# Patient Record
Sex: FEMALE | Race: WHITE | NOT HISPANIC OR LATINO
[De-identification: names, ages, dates, MRNs, and addresses within clinical notes are randomized per-mention and may not be internally consistent; named-entity substitution may affect disease eponyms.]

---

## 2017-01-04 ENCOUNTER — APPOINTMENT (OUTPATIENT)
Dept: SURGERY | Facility: CLINIC | Age: 62
End: 2017-01-04

## 2017-01-10 ENCOUNTER — APPOINTMENT (OUTPATIENT)
Dept: ORTHOPEDIC SURGERY | Facility: CLINIC | Age: 62
End: 2017-01-10

## 2017-01-10 VITALS — WEIGHT: 238 LBS | BODY MASS INDEX: 40.63 KG/M2 | HEIGHT: 64 IN

## 2017-01-10 DIAGNOSIS — M19.90 UNSPECIFIED OSTEOARTHRITIS, UNSPECIFIED SITE: ICD-10-CM

## 2017-01-15 PROBLEM — M19.90 ARTHRITIS OF BOTH KNEES: Status: ACTIVE | Noted: 2017-01-15

## 2017-01-15 PROBLEM — M19.90 ARTHRITIS OF KNEE, RIGHT: Status: ACTIVE | Noted: 2017-01-10

## 2017-01-31 ENCOUNTER — OUTPATIENT (OUTPATIENT)
Dept: OUTPATIENT SERVICES | Facility: HOSPITAL | Age: 62
LOS: 1 days | End: 2017-01-31
Payer: COMMERCIAL

## 2017-01-31 VITALS
HEIGHT: 64 IN | SYSTOLIC BLOOD PRESSURE: 130 MMHG | HEART RATE: 83 BPM | TEMPERATURE: 98 F | DIASTOLIC BLOOD PRESSURE: 80 MMHG | RESPIRATION RATE: 16 BRPM | WEIGHT: 238.1 LBS

## 2017-01-31 DIAGNOSIS — Z90.49 ACQUIRED ABSENCE OF OTHER SPECIFIED PARTS OF DIGESTIVE TRACT: Chronic | ICD-10-CM

## 2017-01-31 DIAGNOSIS — S01.91XA LACERATION WITHOUT FOREIGN BODY OF UNSPECIFIED PART OF HEAD, INITIAL ENCOUNTER: Chronic | ICD-10-CM

## 2017-01-31 DIAGNOSIS — Z98.890 OTHER SPECIFIED POSTPROCEDURAL STATES: Chronic | ICD-10-CM

## 2017-01-31 DIAGNOSIS — M19.90 UNSPECIFIED OSTEOARTHRITIS, UNSPECIFIED SITE: ICD-10-CM

## 2017-01-31 DIAGNOSIS — Z98.51 TUBAL LIGATION STATUS: Chronic | ICD-10-CM

## 2017-01-31 DIAGNOSIS — Z01.818 ENCOUNTER FOR OTHER PREPROCEDURAL EXAMINATION: ICD-10-CM

## 2017-01-31 LAB
ALBUMIN SERPL ELPH-MCNC: 3.7 G/DL — SIGNIFICANT CHANGE UP (ref 3.3–5)
ALP SERPL-CCNC: 78 U/L — SIGNIFICANT CHANGE UP (ref 30–120)
ALT FLD-CCNC: 24 U/L DA — SIGNIFICANT CHANGE UP (ref 10–60)
ANION GAP SERPL CALC-SCNC: 9 MMOL/L — SIGNIFICANT CHANGE UP (ref 5–17)
APTT BLD: 30.3 SEC — SIGNIFICANT CHANGE UP (ref 27.5–37.4)
AST SERPL-CCNC: 15 U/L — SIGNIFICANT CHANGE UP (ref 10–40)
BILIRUB SERPL-MCNC: 0.3 MG/DL — SIGNIFICANT CHANGE UP (ref 0.2–1.2)
BLD GP AB SCN SERPL QL: SIGNIFICANT CHANGE UP
BUN SERPL-MCNC: 17 MG/DL — SIGNIFICANT CHANGE UP (ref 7–23)
CALCIUM SERPL-MCNC: 9.1 MG/DL — SIGNIFICANT CHANGE UP (ref 8.4–10.5)
CHLORIDE SERPL-SCNC: 106 MMOL/L — SIGNIFICANT CHANGE UP (ref 96–108)
CO2 SERPL-SCNC: 28 MMOL/L — SIGNIFICANT CHANGE UP (ref 22–31)
CREAT SERPL-MCNC: 0.75 MG/DL — SIGNIFICANT CHANGE UP (ref 0.5–1.3)
GLUCOSE SERPL-MCNC: 105 MG/DL — HIGH (ref 70–99)
HCT VFR BLD CALC: 40.3 % — SIGNIFICANT CHANGE UP (ref 34.5–45)
HGB BLD-MCNC: 12.7 G/DL — SIGNIFICANT CHANGE UP (ref 11.5–15.5)
INR BLD: 0.97 RATIO — SIGNIFICANT CHANGE UP (ref 0.88–1.16)
MCHC RBC-ENTMCNC: 28.8 PG — SIGNIFICANT CHANGE UP (ref 27–34)
MCHC RBC-ENTMCNC: 31.6 GM/DL — LOW (ref 32–36)
MCV RBC AUTO: 91.3 FL — SIGNIFICANT CHANGE UP (ref 80–100)
MRSA PCR RESULT.: SIGNIFICANT CHANGE UP
PLATELET # BLD AUTO: 230 K/UL — SIGNIFICANT CHANGE UP (ref 150–400)
POTASSIUM SERPL-MCNC: 3.8 MMOL/L — SIGNIFICANT CHANGE UP (ref 3.5–5.3)
POTASSIUM SERPL-SCNC: 3.8 MMOL/L — SIGNIFICANT CHANGE UP (ref 3.5–5.3)
PROT SERPL-MCNC: 7.4 G/DL — SIGNIFICANT CHANGE UP (ref 6–8.3)
PROTHROM AB SERPL-ACNC: 10.9 SEC — SIGNIFICANT CHANGE UP (ref 10–13.1)
RBC # BLD: 4.42 M/UL — SIGNIFICANT CHANGE UP (ref 3.8–5.2)
RBC # FLD: 12.5 % — SIGNIFICANT CHANGE UP (ref 10.3–14.5)
S AUREUS DNA NOSE QL NAA+PROBE: DETECTED
SODIUM SERPL-SCNC: 143 MMOL/L — SIGNIFICANT CHANGE UP (ref 135–145)
WBC # BLD: 7.4 K/UL — SIGNIFICANT CHANGE UP (ref 3.8–10.5)
WBC # FLD AUTO: 7.4 K/UL — SIGNIFICANT CHANGE UP (ref 3.8–10.5)

## 2017-01-31 PROCEDURE — 93010 ELECTROCARDIOGRAM REPORT: CPT

## 2017-01-31 PROCEDURE — 80053 COMPREHEN METABOLIC PANEL: CPT

## 2017-01-31 PROCEDURE — 85027 COMPLETE CBC AUTOMATED: CPT

## 2017-01-31 PROCEDURE — 85730 THROMBOPLASTIN TIME PARTIAL: CPT

## 2017-01-31 PROCEDURE — 93005 ELECTROCARDIOGRAM TRACING: CPT

## 2017-01-31 PROCEDURE — 87640 STAPH A DNA AMP PROBE: CPT

## 2017-01-31 PROCEDURE — 36415 COLL VENOUS BLD VENIPUNCTURE: CPT

## 2017-01-31 PROCEDURE — 86900 BLOOD TYPING SEROLOGIC ABO: CPT

## 2017-01-31 PROCEDURE — 86901 BLOOD TYPING SEROLOGIC RH(D): CPT

## 2017-01-31 PROCEDURE — 87641 MR-STAPH DNA AMP PROBE: CPT

## 2017-01-31 PROCEDURE — G0463: CPT

## 2017-01-31 PROCEDURE — 86850 RBC ANTIBODY SCREEN: CPT

## 2017-01-31 PROCEDURE — 85610 PROTHROMBIN TIME: CPT

## 2017-01-31 RX ORDER — MELOXICAM 15 MG/1
1 TABLET ORAL
Qty: 0 | Refills: 0 | COMMUNITY

## 2017-01-31 RX ORDER — AMLODIPINE BESYLATE 2.5 MG/1
1 TABLET ORAL
Qty: 0 | Refills: 0 | COMMUNITY

## 2017-01-31 NOTE — H&P PST ADULT - NSANTHOSAYNRD_GEN_A_CORE
No. MIRIAN screening performed.  STOP BANG Legend: 0-2 = LOW Risk; 3-4 = INTERMEDIATE Risk; 5-8 = HIGH Risk

## 2017-01-31 NOTE — H&P PST ADULT - FAMILY HISTORY
<<-----Click on this checkbox to enter Family History Family history of lung cancer, MS mother     Father  Still living? No  Family history of liver cancer, Age at diagnosis: Age Unknown     Sibling  Still living? Yes, Estimated age: 51-60  Family history of lupus erythematosus, Age at diagnosis: Age Unknown     Child  Still living? Yes, Estimated age: 31-40  Family history of Crohn's disease, Age at diagnosis: Age Unknown
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
## 2017-01-31 NOTE — H&P PST ADULT - HISTORY OF PRESENT ILLNESS
This is a 60 y/o female who presents with progressively worsening bilateral kneepain worse pain in the right knee . Reports constant pain 3/10 at rest and worst pain 8/10 when getting up from sitting position , driving, walking . patient was taking meloxicam for pain but stopped on 1/16/17 for surgery . scheduled for right knee replacement This is a 62 y/o female who presents with progressively worsening bilateral knee pain worse pain in the right knee . Reports constant pain 3/10 at rest and worst pain 8/10 when getting up from sitting position , driving, and  walking . patient was taking Meloxicam for pain but stopped on 1/16/17 for surgery . scheduled for right knee replacement

## 2017-01-31 NOTE — H&P PST ADULT - PMH
HTN (hypertension)    Hypothyroid    IBS (irritable bowel syndrome)    Osteoarthritis    Positional vertigo    Prosthetic eye globe  left eye GERD (gastroesophageal reflux disease)    HTN (hypertension)    Hypothyroid    IBS (irritable bowel syndrome)    Osteoarthritis    Positional vertigo    Prosthetic eye globe  left eye

## 2017-02-01 RX ORDER — MUPIROCIN 20 MG/G
1 OINTMENT TOPICAL
Qty: 1 | Refills: 0 | OUTPATIENT
Start: 2017-02-01 | End: 2017-02-06

## 2017-02-14 RX ORDER — SODIUM CHLORIDE 9 MG/ML
1000 INJECTION, SOLUTION INTRAVENOUS
Qty: 0 | Refills: 0 | Status: DISCONTINUED | OUTPATIENT
Start: 2017-02-17 | End: 2017-02-17

## 2017-02-16 ENCOUNTER — RESULT REVIEW (OUTPATIENT)
Age: 62
End: 2017-02-16

## 2017-02-16 RX ORDER — ONDANSETRON 8 MG/1
4 TABLET, FILM COATED ORAL EVERY 6 HOURS
Qty: 0 | Refills: 0 | Status: DISCONTINUED | OUTPATIENT
Start: 2017-02-17 | End: 2017-02-21

## 2017-02-16 RX ORDER — MAGNESIUM HYDROXIDE 400 MG/1
30 TABLET, CHEWABLE ORAL DAILY
Qty: 0 | Refills: 0 | Status: DISCONTINUED | OUTPATIENT
Start: 2017-02-17 | End: 2017-02-21

## 2017-02-16 RX ORDER — POLYETHYLENE GLYCOL 3350 17 G/17G
17 POWDER, FOR SOLUTION ORAL DAILY
Qty: 0 | Refills: 0 | Status: DISCONTINUED | OUTPATIENT
Start: 2017-02-17 | End: 2017-02-21

## 2017-02-16 RX ORDER — APREPITANT 80 MG/1
40 CAPSULE ORAL ONCE
Qty: 0 | Refills: 0 | Status: COMPLETED | OUTPATIENT
Start: 2017-02-17 | End: 2017-02-17

## 2017-02-16 RX ORDER — SODIUM CHLORIDE 9 MG/ML
1000 INJECTION, SOLUTION INTRAVENOUS
Qty: 0 | Refills: 0 | Status: DISCONTINUED | OUTPATIENT
Start: 2017-02-17 | End: 2017-02-21

## 2017-02-16 RX ORDER — DOCUSATE SODIUM 100 MG
100 CAPSULE ORAL THREE TIMES A DAY
Qty: 0 | Refills: 0 | Status: DISCONTINUED | OUTPATIENT
Start: 2017-02-17 | End: 2017-02-21

## 2017-02-16 RX ORDER — OXYCODONE HYDROCHLORIDE 5 MG/1
10 TABLET ORAL ONCE
Qty: 0 | Refills: 0 | Status: DISCONTINUED | OUTPATIENT
Start: 2017-02-17 | End: 2017-02-17

## 2017-02-16 RX ORDER — SENNA PLUS 8.6 MG/1
2 TABLET ORAL AT BEDTIME
Qty: 0 | Refills: 0 | Status: DISCONTINUED | OUTPATIENT
Start: 2017-02-17 | End: 2017-02-21

## 2017-02-16 RX ORDER — CELECOXIB 200 MG/1
200 CAPSULE ORAL ONCE
Qty: 0 | Refills: 0 | Status: COMPLETED | OUTPATIENT
Start: 2017-02-17 | End: 2017-02-17
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
## 2017-02-16 NOTE — PATIENT PROFILE ADULT. - PMH
GERD (gastroesophageal reflux disease)    Hypothyroid    IBS (irritable bowel syndrome)    Osteoarthritis    Positional vertigo    Prosthetic eye globe  left eye

## 2017-02-17 ENCOUNTER — TRANSCRIPTION ENCOUNTER (OUTPATIENT)
Age: 62
End: 2017-02-17

## 2017-02-17 ENCOUNTER — APPOINTMENT (OUTPATIENT)
Dept: ORTHOPEDIC SURGERY | Facility: HOSPITAL | Age: 62
End: 2017-02-17

## 2017-02-17 ENCOUNTER — INPATIENT (INPATIENT)
Facility: HOSPITAL | Age: 62
LOS: 3 days | Discharge: HOME | DRG: 470 | End: 2017-02-21
Attending: ORTHOPAEDIC SURGERY | Admitting: ORTHOPAEDIC SURGERY
Payer: COMMERCIAL

## 2017-02-17 VITALS
HEART RATE: 85 BPM | DIASTOLIC BLOOD PRESSURE: 83 MMHG | SYSTOLIC BLOOD PRESSURE: 134 MMHG | RESPIRATION RATE: 13 BRPM | HEIGHT: 64 IN | TEMPERATURE: 98 F | WEIGHT: 238.76 LBS

## 2017-02-17 DIAGNOSIS — M19.90 UNSPECIFIED OSTEOARTHRITIS, UNSPECIFIED SITE: ICD-10-CM

## 2017-02-17 DIAGNOSIS — Z90.49 ACQUIRED ABSENCE OF OTHER SPECIFIED PARTS OF DIGESTIVE TRACT: Chronic | ICD-10-CM

## 2017-02-17 DIAGNOSIS — Z98.51 TUBAL LIGATION STATUS: Chronic | ICD-10-CM

## 2017-02-17 DIAGNOSIS — S01.91XA LACERATION WITHOUT FOREIGN BODY OF UNSPECIFIED PART OF HEAD, INITIAL ENCOUNTER: Chronic | ICD-10-CM

## 2017-02-17 DIAGNOSIS — Z98.890 OTHER SPECIFIED POSTPROCEDURAL STATES: Chronic | ICD-10-CM

## 2017-02-17 LAB
ABO RH CONFIRMATION: SIGNIFICANT CHANGE UP
ANION GAP SERPL CALC-SCNC: 11 MMOL/L — SIGNIFICANT CHANGE UP (ref 5–17)
BUN SERPL-MCNC: 9 MG/DL — SIGNIFICANT CHANGE UP (ref 7–23)
CALCIUM SERPL-MCNC: 8 MG/DL — LOW (ref 8.4–10.5)
CHLORIDE SERPL-SCNC: 107 MMOL/L — SIGNIFICANT CHANGE UP (ref 96–108)
CO2 SERPL-SCNC: 22 MMOL/L — SIGNIFICANT CHANGE UP (ref 22–31)
CREAT SERPL-MCNC: 0.74 MG/DL — SIGNIFICANT CHANGE UP (ref 0.5–1.3)
GLUCOSE SERPL-MCNC: 219 MG/DL — HIGH (ref 70–99)
HCT VFR BLD CALC: 34.5 % — SIGNIFICANT CHANGE UP (ref 34.5–45)
HGB BLD-MCNC: 11 G/DL — LOW (ref 11.5–15.5)
POTASSIUM SERPL-MCNC: 3.7 MMOL/L — SIGNIFICANT CHANGE UP (ref 3.5–5.3)
POTASSIUM SERPL-SCNC: 3.7 MMOL/L — SIGNIFICANT CHANGE UP (ref 3.5–5.3)
SODIUM SERPL-SCNC: 140 MMOL/L — SIGNIFICANT CHANGE UP (ref 135–145)

## 2017-02-17 PROCEDURE — 88305 TISSUE EXAM BY PATHOLOGIST: CPT | Mod: 26

## 2017-02-17 PROCEDURE — 88311 DECALCIFY TISSUE: CPT | Mod: 26

## 2017-02-17 PROCEDURE — 73562 X-RAY EXAM OF KNEE 3: CPT | Mod: 26,RT

## 2017-02-17 PROCEDURE — 27447 TOTAL KNEE ARTHROPLASTY: CPT | Mod: RT

## 2017-02-17 PROCEDURE — 99222 1ST HOSP IP/OBS MODERATE 55: CPT

## 2017-02-17 RX ORDER — ACETAMINOPHEN 500 MG
1000 TABLET ORAL EVERY 8 HOURS
Qty: 0 | Refills: 0 | Status: DISCONTINUED | OUTPATIENT
Start: 2017-02-18 | End: 2017-02-21

## 2017-02-17 RX ORDER — SODIUM CHLORIDE 9 MG/ML
1000 INJECTION, SOLUTION INTRAVENOUS
Qty: 0 | Refills: 0 | Status: DISCONTINUED | OUTPATIENT
Start: 2017-02-17 | End: 2017-02-17

## 2017-02-17 RX ORDER — OXYCODONE HYDROCHLORIDE 5 MG/1
5 TABLET ORAL
Qty: 0 | Refills: 0 | Status: DISCONTINUED | OUTPATIENT
Start: 2017-02-17 | End: 2017-02-18

## 2017-02-17 RX ORDER — AMITRIPTYLINE HCL 25 MG
50 TABLET ORAL AT BEDTIME
Qty: 0 | Refills: 0 | Status: DISCONTINUED | OUTPATIENT
Start: 2017-02-17 | End: 2017-02-21

## 2017-02-17 RX ORDER — ACETAMINOPHEN 500 MG
1000 TABLET ORAL EVERY 6 HOURS
Qty: 0 | Refills: 0 | Status: COMPLETED | OUTPATIENT
Start: 2017-02-17 | End: 2017-02-18

## 2017-02-17 RX ORDER — HYDROMORPHONE HYDROCHLORIDE 2 MG/ML
0.5 INJECTION INTRAMUSCULAR; INTRAVENOUS; SUBCUTANEOUS
Qty: 0 | Refills: 0 | Status: DISCONTINUED | OUTPATIENT
Start: 2017-02-17 | End: 2017-02-17

## 2017-02-17 RX ORDER — LIOTHYRONINE SODIUM 25 UG/1
25 TABLET ORAL DAILY
Qty: 0 | Refills: 0 | Status: DISCONTINUED | OUTPATIENT
Start: 2017-02-17 | End: 2017-02-21

## 2017-02-17 RX ORDER — HYDROMORPHONE HYDROCHLORIDE 2 MG/ML
0.5 INJECTION INTRAMUSCULAR; INTRAVENOUS; SUBCUTANEOUS
Qty: 0 | Refills: 0 | Status: DISCONTINUED | OUTPATIENT
Start: 2017-02-17 | End: 2017-02-21

## 2017-02-17 RX ORDER — ACETAMINOPHEN 500 MG
1000 TABLET ORAL ONCE
Qty: 0 | Refills: 0 | Status: COMPLETED | OUTPATIENT
Start: 2017-02-17 | End: 2017-02-17

## 2017-02-17 RX ORDER — CEFAZOLIN SODIUM 1 G
2000 VIAL (EA) INJECTION ONCE
Qty: 0 | Refills: 0 | Status: COMPLETED | OUTPATIENT
Start: 2017-02-17 | End: 2017-02-17

## 2017-02-17 RX ORDER — APIXABAN 2.5 MG/1
2.5 TABLET, FILM COATED ORAL
Qty: 0 | Refills: 0 | Status: COMPLETED | OUTPATIENT
Start: 2017-02-18 | End: 2017-02-18

## 2017-02-17 RX ORDER — MECLIZINE HCL 12.5 MG
1 TABLET ORAL
Qty: 0 | Refills: 0 | COMMUNITY

## 2017-02-17 RX ORDER — LEVOTHYROXINE SODIUM 125 MCG
100 TABLET ORAL DAILY
Qty: 0 | Refills: 0 | Status: DISCONTINUED | OUTPATIENT
Start: 2017-02-17 | End: 2017-02-21

## 2017-02-17 RX ORDER — TRANEXAMIC ACID 100 MG/ML
1000 INJECTION, SOLUTION INTRAVENOUS ONCE
Qty: 0 | Refills: 0 | Status: COMPLETED | OUTPATIENT
Start: 2017-02-17 | End: 2017-02-17

## 2017-02-17 RX ORDER — APIXABAN 2.5 MG/1
2.5 TABLET, FILM COATED ORAL
Qty: 0 | Refills: 0 | Status: COMPLETED | OUTPATIENT
Start: 2017-02-19 | End: 2017-02-19

## 2017-02-17 RX ORDER — MECLIZINE HCL 12.5 MG
25 TABLET ORAL DAILY
Qty: 0 | Refills: 0 | Status: DISCONTINUED | OUTPATIENT
Start: 2017-02-17 | End: 2017-02-21

## 2017-02-17 RX ORDER — OXYCODONE HYDROCHLORIDE 5 MG/1
10 TABLET ORAL
Qty: 0 | Refills: 0 | Status: DISCONTINUED | OUTPATIENT
Start: 2017-02-17 | End: 2017-02-18

## 2017-02-17 RX ORDER — CEFAZOLIN SODIUM 1 G
2000 VIAL (EA) INJECTION EVERY 8 HOURS
Qty: 0 | Refills: 0 | Status: COMPLETED | OUTPATIENT
Start: 2017-02-17 | End: 2017-02-18

## 2017-02-17 RX ORDER — PANTOPRAZOLE SODIUM 20 MG/1
40 TABLET, DELAYED RELEASE ORAL
Qty: 0 | Refills: 0 | Status: DISCONTINUED | OUTPATIENT
Start: 2017-02-17 | End: 2017-02-21

## 2017-02-17 RX ORDER — APIXABAN 2.5 MG/1
2.5 TABLET, FILM COATED ORAL EVERY 12 HOURS
Qty: 0 | Refills: 0 | Status: DISCONTINUED | OUTPATIENT
Start: 2017-02-19 | End: 2017-02-21

## 2017-02-17 RX ADMIN — ONDANSETRON 4 MILLIGRAM(S): 8 TABLET, FILM COATED ORAL at 19:56

## 2017-02-17 RX ADMIN — APREPITANT 40 MILLIGRAM(S): 80 CAPSULE ORAL at 13:11

## 2017-02-17 RX ADMIN — Medication 200 MILLIGRAM(S): at 18:47

## 2017-02-17 RX ADMIN — SODIUM CHLORIDE 75 MILLILITER(S): 9 INJECTION, SOLUTION INTRAVENOUS at 13:12

## 2017-02-17 RX ADMIN — Medication 100 MILLIGRAM(S): at 23:02

## 2017-02-17 RX ADMIN — CELECOXIB 200 MILLIGRAM(S): 200 CAPSULE ORAL at 13:11

## 2017-02-17 RX ADMIN — Medication 400 MILLIGRAM(S): at 23:01

## 2017-02-17 RX ADMIN — Medication 25 MILLIGRAM(S): at 20:04

## 2017-02-17 RX ADMIN — Medication 1000 MILLIGRAM(S): at 23:01

## 2017-02-17 RX ADMIN — OXYCODONE HYDROCHLORIDE 10 MILLIGRAM(S): 5 TABLET ORAL at 13:11

## 2017-02-18 LAB
ANION GAP SERPL CALC-SCNC: 10 MMOL/L — SIGNIFICANT CHANGE UP (ref 5–17)
BUN SERPL-MCNC: 7 MG/DL — SIGNIFICANT CHANGE UP (ref 7–23)
CALCIUM SERPL-MCNC: 8.5 MG/DL — SIGNIFICANT CHANGE UP (ref 8.4–10.5)
CHLORIDE SERPL-SCNC: 109 MMOL/L — HIGH (ref 96–108)
CO2 SERPL-SCNC: 23 MMOL/L — SIGNIFICANT CHANGE UP (ref 22–31)
CREAT SERPL-MCNC: 0.69 MG/DL — SIGNIFICANT CHANGE UP (ref 0.5–1.3)
GLUCOSE SERPL-MCNC: 110 MG/DL — HIGH (ref 70–99)
HCT VFR BLD CALC: 37.9 % — SIGNIFICANT CHANGE UP (ref 34.5–45)
HGB BLD-MCNC: 12.8 G/DL — SIGNIFICANT CHANGE UP (ref 11.5–15.5)
MCHC RBC-ENTMCNC: 30.2 PG — SIGNIFICANT CHANGE UP (ref 27–34)
MCHC RBC-ENTMCNC: 33.7 GM/DL — SIGNIFICANT CHANGE UP (ref 32–36)
MCV RBC AUTO: 89.4 FL — SIGNIFICANT CHANGE UP (ref 80–100)
PLATELET # BLD AUTO: 147 K/UL — LOW (ref 150–400)
POTASSIUM SERPL-MCNC: 3.8 MMOL/L — SIGNIFICANT CHANGE UP (ref 3.5–5.3)
POTASSIUM SERPL-SCNC: 3.8 MMOL/L — SIGNIFICANT CHANGE UP (ref 3.5–5.3)
RBC # BLD: 4.23 M/UL — SIGNIFICANT CHANGE UP (ref 3.8–5.2)
RBC # FLD: 12.4 % — SIGNIFICANT CHANGE UP (ref 10.3–14.5)
SODIUM SERPL-SCNC: 142 MMOL/L — SIGNIFICANT CHANGE UP (ref 135–145)
WBC # BLD: 6.7 K/UL — SIGNIFICANT CHANGE UP (ref 3.8–10.5)
WBC # FLD AUTO: 6.7 K/UL — SIGNIFICANT CHANGE UP (ref 3.8–10.5)

## 2017-02-18 PROCEDURE — 99233 SBSQ HOSP IP/OBS HIGH 50: CPT

## 2017-02-18 RX ORDER — TRAMADOL HYDROCHLORIDE 50 MG/1
50 TABLET ORAL EVERY 6 HOURS
Qty: 0 | Refills: 0 | Status: DISCONTINUED | OUTPATIENT
Start: 2017-02-18 | End: 2017-02-21

## 2017-02-18 RX ORDER — TRAMADOL HYDROCHLORIDE 50 MG/1
100 TABLET ORAL EVERY 6 HOURS
Qty: 0 | Refills: 0 | Status: DISCONTINUED | OUTPATIENT
Start: 2017-02-18 | End: 2017-02-21

## 2017-02-18 RX ORDER — HYOSCYAMINE SULFATE 0.13 MG
0.38 TABLET ORAL
Qty: 0 | Refills: 0 | Status: DISCONTINUED | OUTPATIENT
Start: 2017-02-18 | End: 2017-02-21

## 2017-02-18 RX ADMIN — Medication 100 MICROGRAM(S): at 05:35

## 2017-02-18 RX ADMIN — Medication 25 MILLIGRAM(S): at 10:40

## 2017-02-18 RX ADMIN — Medication 100 MILLIGRAM(S): at 06:32

## 2017-02-18 RX ADMIN — Medication 1000 MILLIGRAM(S): at 17:38

## 2017-02-18 RX ADMIN — TRAMADOL HYDROCHLORIDE 100 MILLIGRAM(S): 50 TABLET ORAL at 19:31

## 2017-02-18 RX ADMIN — Medication 1000 MILLIGRAM(S): at 17:37

## 2017-02-18 RX ADMIN — APIXABAN 2.5 MILLIGRAM(S): 2.5 TABLET, FILM COATED ORAL at 15:22

## 2017-02-18 RX ADMIN — TRAMADOL HYDROCHLORIDE 100 MILLIGRAM(S): 50 TABLET ORAL at 20:01

## 2017-02-18 RX ADMIN — Medication 400 MILLIGRAM(S): at 10:34

## 2017-02-18 RX ADMIN — TRAMADOL HYDROCHLORIDE 50 MILLIGRAM(S): 50 TABLET ORAL at 12:40

## 2017-02-18 RX ADMIN — LIOTHYRONINE SODIUM 25 MICROGRAM(S): 25 TABLET ORAL at 10:34

## 2017-02-18 RX ADMIN — Medication 50 MILLIGRAM(S): at 21:47

## 2017-02-18 RX ADMIN — PANTOPRAZOLE SODIUM 40 MILLIGRAM(S): 20 TABLET, DELAYED RELEASE ORAL at 05:35

## 2017-02-18 RX ADMIN — Medication 1000 MILLIGRAM(S): at 10:38

## 2017-02-18 RX ADMIN — Medication 400 MILLIGRAM(S): at 05:35

## 2017-02-18 RX ADMIN — Medication 0.38 MILLIGRAM(S): at 11:45

## 2017-02-18 RX ADMIN — SODIUM CHLORIDE 75 MILLILITER(S): 9 INJECTION, SOLUTION INTRAVENOUS at 06:32

## 2017-02-18 RX ADMIN — TRAMADOL HYDROCHLORIDE 50 MILLIGRAM(S): 50 TABLET ORAL at 11:47

## 2017-02-18 RX ADMIN — Medication 1000 MILLIGRAM(S): at 05:43

## 2017-02-18 RX ADMIN — Medication 100 MILLIGRAM(S): at 05:35

## 2017-02-18 NOTE — PHYSICAL THERAPY INITIAL EVALUATION ADULT - ADDITIONAL COMMENTS
Pt lives with  in house with no steps to enter, 1 flight of stairs with 1 rail to bedroom.  Pt has SC and RW at home.  Prior to current, pt was I in amb and drove.

## 2017-02-18 NOTE — DIETITIAN INITIAL EVALUATION ADULT. - OTHER INFO
pt admitted with OA complications PMHx includes Gerds. IBS,OA. s/p cholestystectomy Pt has no complaints/conerns regarding diet at this point pt admitted with OA complications PMH includes Gerds. IBS, OA. s/p cholecystectomy Pt has no complaints/concerns regarding diet at this point

## 2017-02-18 NOTE — DIETITIAN INITIAL EVALUATION ADULT. - NUTRITIONGOAL OUTCOME1
Pt will improve nutrition practices,  Pt not expected to be compliant at this time due to immobility

## 2017-02-18 NOTE — PHYSICAL THERAPY INITIAL EVALUATION ADULT - ACTIVE RANGE OF MOTION EXAMINATION, REHAB EVAL
deficits as listed below/right LE/Left LE Active ROM was WFL (within functional limits)/adrienne. upper extremity Active ROM was WNL (within normal limits)

## 2017-02-18 NOTE — OCCUPATIONAL THERAPY INITIAL EVALUATION ADULT - ADDITIONAL COMMENTS
Live with her . no steps to enter. 12 inside with handrail. Has a stall shower. Own a RW and cane,   Commode ordered with case management. SC will not garfield in stall.

## 2017-02-19 ENCOUNTER — TRANSCRIPTION ENCOUNTER (OUTPATIENT)
Age: 62
End: 2017-02-19

## 2017-02-19 LAB
ANION GAP SERPL CALC-SCNC: 9 MMOL/L — SIGNIFICANT CHANGE UP (ref 5–17)
BUN SERPL-MCNC: 7 MG/DL — SIGNIFICANT CHANGE UP (ref 7–23)
CALCIUM SERPL-MCNC: 8.9 MG/DL — SIGNIFICANT CHANGE UP (ref 8.4–10.5)
CHLORIDE SERPL-SCNC: 109 MMOL/L — HIGH (ref 96–108)
CO2 SERPL-SCNC: 27 MMOL/L — SIGNIFICANT CHANGE UP (ref 22–31)
CREAT SERPL-MCNC: 0.73 MG/DL — SIGNIFICANT CHANGE UP (ref 0.5–1.3)
GLUCOSE SERPL-MCNC: 118 MG/DL — HIGH (ref 70–99)
HCT VFR BLD CALC: 34.4 % — LOW (ref 34.5–45)
HGB BLD-MCNC: 11.4 G/DL — LOW (ref 11.5–15.5)
MCHC RBC-ENTMCNC: 29.3 PG — SIGNIFICANT CHANGE UP (ref 27–34)
MCHC RBC-ENTMCNC: 33 GM/DL — SIGNIFICANT CHANGE UP (ref 32–36)
MCV RBC AUTO: 88.6 FL — SIGNIFICANT CHANGE UP (ref 80–100)
PLATELET # BLD AUTO: 187 K/UL — SIGNIFICANT CHANGE UP (ref 150–400)
POTASSIUM SERPL-MCNC: 3.5 MMOL/L — SIGNIFICANT CHANGE UP (ref 3.5–5.3)
POTASSIUM SERPL-SCNC: 3.5 MMOL/L — SIGNIFICANT CHANGE UP (ref 3.5–5.3)
RBC # BLD: 3.88 M/UL — SIGNIFICANT CHANGE UP (ref 3.8–5.2)
RBC # FLD: 12.1 % — SIGNIFICANT CHANGE UP (ref 10.3–14.5)
SODIUM SERPL-SCNC: 145 MMOL/L — SIGNIFICANT CHANGE UP (ref 135–145)
WBC # BLD: 9.7 K/UL — SIGNIFICANT CHANGE UP (ref 3.8–10.5)
WBC # FLD AUTO: 9.7 K/UL — SIGNIFICANT CHANGE UP (ref 3.8–10.5)

## 2017-02-19 PROCEDURE — 93970 EXTREMITY STUDY: CPT | Mod: 26

## 2017-02-19 PROCEDURE — 99232 SBSQ HOSP IP/OBS MODERATE 35: CPT

## 2017-02-19 RX ORDER — MECLIZINE HCL 12.5 MG
25 TABLET ORAL ONCE
Qty: 0 | Refills: 0 | Status: COMPLETED | OUTPATIENT
Start: 2017-02-19 | End: 2017-02-19

## 2017-02-19 RX ADMIN — Medication 1000 MILLIGRAM(S): at 08:51

## 2017-02-19 RX ADMIN — TRAMADOL HYDROCHLORIDE 100 MILLIGRAM(S): 50 TABLET ORAL at 21:56

## 2017-02-19 RX ADMIN — Medication 1000 MILLIGRAM(S): at 09:41

## 2017-02-19 RX ADMIN — Medication 1000 MILLIGRAM(S): at 18:18

## 2017-02-19 RX ADMIN — LIOTHYRONINE SODIUM 25 MICROGRAM(S): 25 TABLET ORAL at 05:49

## 2017-02-19 RX ADMIN — TRAMADOL HYDROCHLORIDE 100 MILLIGRAM(S): 50 TABLET ORAL at 15:14

## 2017-02-19 RX ADMIN — APIXABAN 2.5 MILLIGRAM(S): 2.5 TABLET, FILM COATED ORAL at 21:26

## 2017-02-19 RX ADMIN — APIXABAN 2.5 MILLIGRAM(S): 2.5 TABLET, FILM COATED ORAL at 00:11

## 2017-02-19 RX ADMIN — Medication 25 MILLIGRAM(S): at 09:08

## 2017-02-19 RX ADMIN — APIXABAN 2.5 MILLIGRAM(S): 2.5 TABLET, FILM COATED ORAL at 11:57

## 2017-02-19 RX ADMIN — TRAMADOL HYDROCHLORIDE 100 MILLIGRAM(S): 50 TABLET ORAL at 21:26

## 2017-02-19 RX ADMIN — Medication 25 MILLIGRAM(S): at 18:17

## 2017-02-19 RX ADMIN — TRAMADOL HYDROCHLORIDE 100 MILLIGRAM(S): 50 TABLET ORAL at 03:54

## 2017-02-19 RX ADMIN — PANTOPRAZOLE SODIUM 40 MILLIGRAM(S): 20 TABLET, DELAYED RELEASE ORAL at 05:49

## 2017-02-19 RX ADMIN — TRAMADOL HYDROCHLORIDE 100 MILLIGRAM(S): 50 TABLET ORAL at 03:24

## 2017-02-19 RX ADMIN — Medication 1000 MILLIGRAM(S): at 00:11

## 2017-02-19 RX ADMIN — Medication 50 MILLIGRAM(S): at 21:26

## 2017-02-19 RX ADMIN — TRAMADOL HYDROCHLORIDE 100 MILLIGRAM(S): 50 TABLET ORAL at 08:57

## 2017-02-19 RX ADMIN — Medication 1000 MILLIGRAM(S): at 01:21

## 2017-02-19 RX ADMIN — Medication 100 MICROGRAM(S): at 05:49

## 2017-02-19 RX ADMIN — Medication 100 MILLIGRAM(S): at 15:14

## 2017-02-20 ENCOUNTER — TRANSCRIPTION ENCOUNTER (OUTPATIENT)
Age: 62
End: 2017-02-20

## 2017-02-20 LAB
ANION GAP SERPL CALC-SCNC: 8 MMOL/L — SIGNIFICANT CHANGE UP (ref 5–17)
BUN SERPL-MCNC: 7 MG/DL — SIGNIFICANT CHANGE UP (ref 7–23)
CALCIUM SERPL-MCNC: 8.5 MG/DL — SIGNIFICANT CHANGE UP (ref 8.4–10.5)
CHLORIDE SERPL-SCNC: 107 MMOL/L — SIGNIFICANT CHANGE UP (ref 96–108)
CO2 SERPL-SCNC: 27 MMOL/L — SIGNIFICANT CHANGE UP (ref 22–31)
CREAT SERPL-MCNC: 0.57 MG/DL — SIGNIFICANT CHANGE UP (ref 0.5–1.3)
GLUCOSE SERPL-MCNC: 113 MG/DL — HIGH (ref 70–99)
HCT VFR BLD CALC: 33.4 % — LOW (ref 34.5–45)
HGB BLD-MCNC: 10.9 G/DL — LOW (ref 11.5–15.5)
MCHC RBC-ENTMCNC: 29 PG — SIGNIFICANT CHANGE UP (ref 27–34)
MCHC RBC-ENTMCNC: 32.5 GM/DL — SIGNIFICANT CHANGE UP (ref 32–36)
MCV RBC AUTO: 89.3 FL — SIGNIFICANT CHANGE UP (ref 80–100)
PLATELET # BLD AUTO: 168 K/UL — SIGNIFICANT CHANGE UP (ref 150–400)
POTASSIUM SERPL-MCNC: 3.3 MMOL/L — LOW (ref 3.5–5.3)
POTASSIUM SERPL-SCNC: 3.3 MMOL/L — LOW (ref 3.5–5.3)
RBC # BLD: 3.74 M/UL — LOW (ref 3.8–5.2)
RBC # FLD: 12.4 % — SIGNIFICANT CHANGE UP (ref 10.3–14.5)
SODIUM SERPL-SCNC: 142 MMOL/L — SIGNIFICANT CHANGE UP (ref 135–145)
WBC # BLD: 8.7 K/UL — SIGNIFICANT CHANGE UP (ref 3.8–10.5)
WBC # FLD AUTO: 8.7 K/UL — SIGNIFICANT CHANGE UP (ref 3.8–10.5)

## 2017-02-20 PROCEDURE — 99232 SBSQ HOSP IP/OBS MODERATE 35: CPT

## 2017-02-20 RX ORDER — POTASSIUM CHLORIDE 20 MEQ
40 PACKET (EA) ORAL ONCE
Qty: 0 | Refills: 0 | Status: COMPLETED | OUTPATIENT
Start: 2017-02-20 | End: 2017-02-20

## 2017-02-20 RX ADMIN — TRAMADOL HYDROCHLORIDE 100 MILLIGRAM(S): 50 TABLET ORAL at 14:41

## 2017-02-20 RX ADMIN — Medication 1000 MILLIGRAM(S): at 08:10

## 2017-02-20 RX ADMIN — TRAMADOL HYDROCHLORIDE 100 MILLIGRAM(S): 50 TABLET ORAL at 05:50

## 2017-02-20 RX ADMIN — Medication 40 MILLIEQUIVALENT(S): at 09:10

## 2017-02-20 RX ADMIN — SENNA PLUS 2 TABLET(S): 8.6 TABLET ORAL at 21:25

## 2017-02-20 RX ADMIN — TRAMADOL HYDROCHLORIDE 100 MILLIGRAM(S): 50 TABLET ORAL at 22:00

## 2017-02-20 RX ADMIN — LIOTHYRONINE SODIUM 25 MICROGRAM(S): 25 TABLET ORAL at 05:19

## 2017-02-20 RX ADMIN — Medication 25 MILLIGRAM(S): at 08:10

## 2017-02-20 RX ADMIN — APIXABAN 2.5 MILLIGRAM(S): 2.5 TABLET, FILM COATED ORAL at 21:25

## 2017-02-20 RX ADMIN — TRAMADOL HYDROCHLORIDE 100 MILLIGRAM(S): 50 TABLET ORAL at 21:25

## 2017-02-20 RX ADMIN — Medication 100 MILLIGRAM(S): at 21:25

## 2017-02-20 RX ADMIN — TRAMADOL HYDROCHLORIDE 100 MILLIGRAM(S): 50 TABLET ORAL at 05:20

## 2017-02-20 RX ADMIN — Medication 100 MILLIGRAM(S): at 13:34

## 2017-02-20 RX ADMIN — Medication 100 MICROGRAM(S): at 05:19

## 2017-02-20 RX ADMIN — PANTOPRAZOLE SODIUM 40 MILLIGRAM(S): 20 TABLET, DELAYED RELEASE ORAL at 05:19

## 2017-02-20 RX ADMIN — Medication 1000 MILLIGRAM(S): at 17:22

## 2017-02-20 RX ADMIN — TRAMADOL HYDROCHLORIDE 100 MILLIGRAM(S): 50 TABLET ORAL at 13:52

## 2017-02-20 RX ADMIN — APIXABAN 2.5 MILLIGRAM(S): 2.5 TABLET, FILM COATED ORAL at 08:10

## 2017-02-20 RX ADMIN — Medication 50 MILLIGRAM(S): at 21:25

## 2017-02-20 NOTE — DISCHARGE NOTE ADULT - CARE PROVIDERS DIRECT ADDRESSES
[email or web address] In :60 [email or web address]

## 2017-02-20 NOTE — DISCHARGE NOTE ADULT - MEDICATION SUMMARY - MEDICATIONS TO TAKE
I will START or STAY ON the medications listed below when I get home from the hospital:    acetaminophen 500 mg oral tablet  -- 2 tab(s) by mouth every 8 hours  -- Indication: For pain    traMADol 50 mg oral tablet  -- 1 tab(s) by mouth every 6 hours, As Needed -for moderate pain MDD:8  -- Indication: For pain    apixaban 2.5 mg oral tablet  -- 1 tab(s) by mouth every 12 hours until 3/1/17.  -- Indication: For prevention of blood clots    amitriptyline 50 mg oral tablet  -- 1 tab(s) by mouth once a day (at bedtime)  -- Indication: For depression    meclizine 25 mg oral tablet  -- 1 tab(s) by mouth once a day  -- Indication: For vertigo    docusate sodium 100 mg oral capsule  -- 1 cap(s) by mouth 3 times a day, As Needed - for constipation  -- Indication: For constipation    senna oral tablet  -- 2 tab(s) by mouth once a day (at bedtime), As Needed - for constipation  -- Indication: For constipation    omeprazole 40 mg oral delayed release capsule  -- 1 cap(s) by mouth once a day  -- Indication: For stomach protection    levothyroxine 100 mcg (0.1 mg) oral tablet  -- 1 tab(s) by mouth once a day  -- Indication: For hypothroidism    liothyronine 25 mcg oral tablet  -- 1 tab(s) by mouth once a day  -- Indication: For hypothyroidism

## 2017-02-20 NOTE — DISCHARGE NOTE ADULT - CARE PLAN
Principal Discharge DX:	S/P TKR (total knee replacement) using cement, right  Goal:	Improve quality of life  Instructions for follow-up, activity and diet:	Physical Therapy/Occupational Therapy for: ambulation, transfers, stairs, ADL's (activities of daily living), range of motion exercises, and isometrics  -Activity  • Weight Bearing as tolerated with rolling walker.  • Take short, frequent walks increasing the distance that you walk each day as tolerated.  • Change your position every hour to decrease pain and stiffness.  • Continue the exercises taught to you by your physical therapist.  • No driving until cleared by the doctor.  • No tub baths, hot tubs, or swimming pools until instructed by your doctor.  • Do not squat down on the floor.  • Do not kneel or twist your knee.  • Range of Motion Goals: Flexion= 120 degrees, Extension = 0 degrees  Keep incision clean and dry. May shower 5 days after surgery if no drainage from incision.  Suture/Prineo removal 2 weeks after surgery at Surgeon's office.  Instructions for follow-up, activity and diet:	Call your doctor if you experience:  • An increase in pain not controlled by pain medication or change in activity or  position.  • Temperature greater than 101° F.  • Redness, increased swelling or foul smelling drainage from or around the  incision.  • Numbness, tingling or a change in color or temperature of the operative leg.  • Call your doctor immediately if you experience chest pain, shortness of breath or calf pain.    For Constipation :   • Increase your water intake. Drink at least 8 glasses of water daily.  • Try adding fiber to your diet by eating fruits, vegetables and foods that are rich in grains.  • If you do experience constipation, you may take an over-the-counter stool softener/laxative such as Colace, Senokot or Milk of Magnesia.

## 2017-02-20 NOTE — DISCHARGE NOTE ADULT - HOME CARE AGENCY
[facility] [phone] [facility] [phone]; [facility] nurse/Home Care RN will call the day after discharge to set-up visit appointment for that day then PT/OT services will also follow; if you have NOT heard from anyone by 12 noon the day after discharge, please call home care agency.

## 2017-02-20 NOTE — DISCHARGE NOTE ADULT - VISION (WITH CORRECTIVE LENSES IF THE PATIENT USUALLY WEARS THEM):
Pt has left eye prosthesis/Partially impaired: cannot see medication labels or newsprint, but can see obstacles in path, and the surrounding layout; can count fingers at arm's length

## 2017-02-20 NOTE — DISCHARGE NOTE ADULT - DURABLE MEDICAL EQUIPMENT AGENCY
3in 1 commode thru [facility] [phone] 3in 1 commode thru [facility] [phone]; pt has SAC and RW @ home PTA.

## 2017-02-20 NOTE — DISCHARGE NOTE ADULT - NS AS ACTIVITY OBS
Walking-Indoors allowed/Do not drive or operate machinery/No Heavy lifting/straining/Walking-Outdoors allowed/Stairs allowed/Showering allowed

## 2017-02-20 NOTE — DISCHARGE NOTE ADULT - MEDICATION SUMMARY - MEDICATIONS TO STOP TAKING
I will STOP taking the medications listed below when I get home from the hospital:    mupirocin topical 2% topical ointment  -- Apply to each nostril 2 times a day  -- For external use only.

## 2017-02-20 NOTE — DISCHARGE NOTE ADULT - PLAN OF CARE
Improve quality of life Physical Therapy/Occupational Therapy for: ambulation, transfers, stairs, ADL's (activities of daily living), range of motion exercises, and isometrics  -Activity  • Weight Bearing as tolerated with rolling walker.  • Take short, frequent walks increasing the distance that you walk each day as tolerated.  • Change your position every hour to decrease pain and stiffness.  • Continue the exercises taught to you by your physical therapist.  • No driving until cleared by the doctor.  • No tub baths, hot tubs, or swimming pools until instructed by your doctor.  • Do not squat down on the floor.  • Do not kneel or twist your knee.  • Range of Motion Goals: Flexion= 120 degrees, Extension = 0 degrees  Keep incision clean and dry. May shower 5 days after surgery if no drainage from incision.  Suture/Prineo removal 2 weeks after surgery at Surgeon's office. Call your doctor if you experience:  • An increase in pain not controlled by pain medication or change in activity or  position.  • Temperature greater than 101° F.  • Redness, increased swelling or foul smelling drainage from or around the  incision.  • Numbness, tingling or a change in color or temperature of the operative leg.  • Call your doctor immediately if you experience chest pain, shortness of breath or calf pain.    For Constipation :   • Increase your water intake. Drink at least 8 glasses of water daily.  • Try adding fiber to your diet by eating fruits, vegetables and foods that are rich in grains.  • If you do experience constipation, you may take an over-the-counter stool softener/laxative such as Colace, Senokot or Milk of Magnesia.

## 2017-02-20 NOTE — DISCHARGE NOTE ADULT - PATIENT PORTAL LINK FT
“You can access the Follow[facility] Patient Portal, offered by [facility], by registering with the following website: [email or web address]”

## 2017-02-20 NOTE — DISCHARGE NOTE ADULT - HOSPITAL COURSE
This patient was admitted to [facility] with a history of severe degenerative joint disease of the right knee.  Patient went to Pre-Surgical Testing at [facility] and was medically cleared to undergo elective procedure.  No operative or tushar-operative complications arose during patients hospital course.  Patient received antibiotic according to SCIP guidelines for infection prevention.  Eliquis was given for DVT prophylaxis.  Anesthesia, Medical Hospitalist, Physical Therapy and Occupational Therapy were consulted. Patient is stable for discharge with a good prognosis.  Appropriate discharge instructions and medications are provided in this document.

## 2017-02-21 VITALS
RESPIRATION RATE: 16 BRPM | TEMPERATURE: 98 F | OXYGEN SATURATION: 92 % | HEART RATE: 71 BPM | DIASTOLIC BLOOD PRESSURE: 73 MMHG | SYSTOLIC BLOOD PRESSURE: 152 MMHG

## 2017-02-21 LAB
POTASSIUM SERPL-MCNC: 3.4 MMOL/L — LOW (ref 3.5–5.3)
POTASSIUM SERPL-SCNC: 3.4 MMOL/L — LOW (ref 3.5–5.3)

## 2017-02-21 PROCEDURE — 80048 BASIC METABOLIC PNL TOTAL CA: CPT

## 2017-02-21 PROCEDURE — 97530 THERAPEUTIC ACTIVITIES: CPT

## 2017-02-21 PROCEDURE — C1776: CPT

## 2017-02-21 PROCEDURE — 97161 PT EVAL LOW COMPLEX 20 MIN: CPT

## 2017-02-21 PROCEDURE — C1713: CPT

## 2017-02-21 PROCEDURE — 73562 X-RAY EXAM OF KNEE 3: CPT

## 2017-02-21 PROCEDURE — 99233 SBSQ HOSP IP/OBS HIGH 50: CPT

## 2017-02-21 PROCEDURE — 97165 OT EVAL LOW COMPLEX 30 MIN: CPT

## 2017-02-21 PROCEDURE — 94664 DEMO&/EVAL PT USE INHALER: CPT

## 2017-02-21 PROCEDURE — 84132 ASSAY OF SERUM POTASSIUM: CPT

## 2017-02-21 PROCEDURE — 85027 COMPLETE CBC AUTOMATED: CPT

## 2017-02-21 PROCEDURE — 88311 DECALCIFY TISSUE: CPT

## 2017-02-21 PROCEDURE — 97535 SELF CARE MNGMENT TRAINING: CPT

## 2017-02-21 PROCEDURE — 88305 TISSUE EXAM BY PATHOLOGIST: CPT

## 2017-02-21 PROCEDURE — 97116 GAIT TRAINING THERAPY: CPT

## 2017-02-21 PROCEDURE — 36415 COLL VENOUS BLD VENIPUNCTURE: CPT

## 2017-02-21 PROCEDURE — 97110 THERAPEUTIC EXERCISES: CPT

## 2017-02-21 PROCEDURE — 85018 HEMOGLOBIN: CPT

## 2017-02-21 PROCEDURE — 93970 EXTREMITY STUDY: CPT

## 2017-02-21 RX ORDER — MECLIZINE HCL 12.5 MG
1 TABLET ORAL
Qty: 0 | Refills: 0 | COMMUNITY

## 2017-02-21 RX ORDER — AMITRIPTYLINE HCL 25 MG
1 TABLET ORAL
Qty: 0 | Refills: 0 | COMMUNITY
Start: 2017-02-21

## 2017-02-21 RX ORDER — DOCUSATE SODIUM 100 MG
1 CAPSULE ORAL
Qty: 0 | Refills: 0 | COMMUNITY
Start: 2017-02-21

## 2017-02-21 RX ORDER — LIOTHYRONINE SODIUM 25 UG/1
1 TABLET ORAL
Qty: 0 | Refills: 0 | COMMUNITY
Start: 2017-02-21

## 2017-02-21 RX ORDER — APIXABAN 2.5 MG/1
1 TABLET, FILM COATED ORAL
Qty: 18 | Refills: 0 | OUTPATIENT
Start: 2017-02-21

## 2017-02-21 RX ORDER — MECLIZINE HCL 12.5 MG
1 TABLET ORAL
Qty: 0 | Refills: 0 | COMMUNITY
Start: 2017-02-21

## 2017-02-21 RX ORDER — LIOTHYRONINE SODIUM 25 UG/1
1 TABLET ORAL
Qty: 0 | Refills: 0 | COMMUNITY

## 2017-02-21 RX ORDER — TRAMADOL HYDROCHLORIDE 50 MG/1
1 TABLET ORAL
Qty: 80 | Refills: 0 | OUTPATIENT
Start: 2017-02-21

## 2017-02-21 RX ORDER — SENNA PLUS 8.6 MG/1
2 TABLET ORAL
Qty: 0 | Refills: 0 | COMMUNITY
Start: 2017-02-21

## 2017-02-21 RX ORDER — LEVOTHYROXINE SODIUM 125 MCG
1 TABLET ORAL
Qty: 0 | Refills: 0 | COMMUNITY
Start: 2017-02-21

## 2017-02-21 RX ORDER — ACETAMINOPHEN 500 MG
2 TABLET ORAL
Qty: 90 | Refills: 0 | OUTPATIENT
Start: 2017-02-21

## 2017-02-21 RX ORDER — POTASSIUM CHLORIDE 20 MEQ
40 PACKET (EA) ORAL ONCE
Qty: 0 | Refills: 0 | Status: COMPLETED | OUTPATIENT
Start: 2017-02-21 | End: 2017-02-21

## 2017-02-21 RX ORDER — AMITRIPTYLINE HCL 25 MG
1 TABLET ORAL
Qty: 0 | Refills: 0 | COMMUNITY

## 2017-02-21 RX ORDER — LEVOTHYROXINE SODIUM 125 MCG
1 TABLET ORAL
Qty: 0 | Refills: 0 | COMMUNITY

## 2017-02-21 RX ADMIN — Medication 1000 MILLIGRAM(S): at 01:57

## 2017-02-21 RX ADMIN — LIOTHYRONINE SODIUM 25 MICROGRAM(S): 25 TABLET ORAL at 06:12

## 2017-02-21 RX ADMIN — Medication 40 MILLIEQUIVALENT(S): at 08:33

## 2017-02-21 RX ADMIN — TRAMADOL HYDROCHLORIDE 100 MILLIGRAM(S): 50 TABLET ORAL at 09:07

## 2017-02-21 RX ADMIN — Medication 1000 MILLIGRAM(S): at 08:33

## 2017-02-21 RX ADMIN — APIXABAN 2.5 MILLIGRAM(S): 2.5 TABLET, FILM COATED ORAL at 08:33

## 2017-02-21 RX ADMIN — PANTOPRAZOLE SODIUM 40 MILLIGRAM(S): 20 TABLET, DELAYED RELEASE ORAL at 08:33

## 2017-02-21 RX ADMIN — Medication 25 MILLIGRAM(S): at 08:33

## 2017-02-21 RX ADMIN — Medication 1000 MILLIGRAM(S): at 08:34

## 2017-02-21 RX ADMIN — Medication 1000 MILLIGRAM(S): at 01:36

## 2017-02-21 RX ADMIN — TRAMADOL HYDROCHLORIDE 100 MILLIGRAM(S): 50 TABLET ORAL at 08:34

## 2017-02-21 RX ADMIN — Medication 100 MICROGRAM(S): at 06:12

## 2017-02-28 ENCOUNTER — APPOINTMENT (OUTPATIENT)
Dept: ORTHOPEDIC SURGERY | Facility: CLINIC | Age: 62
End: 2017-02-28

## 2017-02-28 VITALS
DIASTOLIC BLOOD PRESSURE: 77 MMHG | HEART RATE: 94 BPM | HEIGHT: 64 IN | SYSTOLIC BLOOD PRESSURE: 115 MMHG | BODY MASS INDEX: 40.63 KG/M2 | WEIGHT: 238 LBS

## 2017-03-01 ENCOUNTER — CHART COPY (OUTPATIENT)
Age: 62
End: 2017-03-01

## 2017-03-22 ENCOUNTER — APPOINTMENT (OUTPATIENT)
Dept: ORTHOPEDIC SURGERY | Facility: CLINIC | Age: 62
End: 2017-03-22

## 2017-03-22 VITALS
HEART RATE: 90 BPM | WEIGHT: 238 LBS | SYSTOLIC BLOOD PRESSURE: 127 MMHG | DIASTOLIC BLOOD PRESSURE: 81 MMHG | HEIGHT: 64 IN | BODY MASS INDEX: 40.63 KG/M2

## 2017-05-03 ENCOUNTER — APPOINTMENT (OUTPATIENT)
Dept: ORTHOPEDIC SURGERY | Facility: CLINIC | Age: 62
End: 2017-05-03

## 2017-05-03 VITALS
HEIGHT: 64 IN | HEART RATE: 88 BPM | SYSTOLIC BLOOD PRESSURE: 106 MMHG | BODY MASS INDEX: 40.63 KG/M2 | WEIGHT: 238 LBS | DIASTOLIC BLOOD PRESSURE: 69 MMHG

## 2017-06-14 ENCOUNTER — APPOINTMENT (OUTPATIENT)
Dept: ORTHOPEDIC SURGERY | Facility: CLINIC | Age: 62
End: 2017-06-14

## 2017-06-14 VITALS
BODY MASS INDEX: 40.63 KG/M2 | HEART RATE: 79 BPM | DIASTOLIC BLOOD PRESSURE: 74 MMHG | WEIGHT: 238 LBS | SYSTOLIC BLOOD PRESSURE: 119 MMHG | HEIGHT: 64 IN

## 2017-07-19 ENCOUNTER — APPOINTMENT (OUTPATIENT)
Dept: ORTHOPEDIC SURGERY | Facility: CLINIC | Age: 62
End: 2017-07-19

## 2017-08-29 ENCOUNTER — APPOINTMENT (OUTPATIENT)
Dept: ORTHOPEDIC SURGERY | Facility: CLINIC | Age: 62
End: 2017-08-29
Payer: COMMERCIAL

## 2017-08-29 VITALS
SYSTOLIC BLOOD PRESSURE: 138 MMHG | HEART RATE: 79 BPM | HEIGHT: 64 IN | WEIGHT: 238 LBS | DIASTOLIC BLOOD PRESSURE: 78 MMHG | BODY MASS INDEX: 40.63 KG/M2

## 2017-08-29 DIAGNOSIS — M17.12 UNILATERAL PRIMARY OSTEOARTHRITIS, LEFT KNEE: ICD-10-CM

## 2017-08-29 PROCEDURE — 99214 OFFICE O/P EST MOD 30 MIN: CPT

## 2017-12-04 ENCOUNTER — APPOINTMENT (OUTPATIENT)
Dept: SURGERY | Facility: CLINIC | Age: 62
End: 2017-12-04

## 2017-12-12 ENCOUNTER — OUTPATIENT (OUTPATIENT)
Dept: OUTPATIENT SERVICES | Facility: HOSPITAL | Age: 62
LOS: 1 days | End: 2017-12-12
Payer: COMMERCIAL

## 2017-12-12 VITALS
HEIGHT: 64 IN | WEIGHT: 246.92 LBS | TEMPERATURE: 98 F | DIASTOLIC BLOOD PRESSURE: 84 MMHG | SYSTOLIC BLOOD PRESSURE: 130 MMHG | RESPIRATION RATE: 16 BRPM | OXYGEN SATURATION: 96 % | HEART RATE: 84 BPM

## 2017-12-12 DIAGNOSIS — Z98.51 TUBAL LIGATION STATUS: Chronic | ICD-10-CM

## 2017-12-12 DIAGNOSIS — Z01.818 ENCOUNTER FOR OTHER PREPROCEDURAL EXAMINATION: ICD-10-CM

## 2017-12-12 DIAGNOSIS — Z96.651 PRESENCE OF RIGHT ARTIFICIAL KNEE JOINT: Chronic | ICD-10-CM

## 2017-12-12 DIAGNOSIS — M17.12 UNILATERAL PRIMARY OSTEOARTHRITIS, LEFT KNEE: ICD-10-CM

## 2017-12-12 DIAGNOSIS — S01.91XA LACERATION WITHOUT FOREIGN BODY OF UNSPECIFIED PART OF HEAD, INITIAL ENCOUNTER: Chronic | ICD-10-CM

## 2017-12-12 DIAGNOSIS — Z90.49 ACQUIRED ABSENCE OF OTHER SPECIFIED PARTS OF DIGESTIVE TRACT: Chronic | ICD-10-CM

## 2017-12-12 DIAGNOSIS — M25.562 PAIN IN LEFT KNEE: ICD-10-CM

## 2017-12-12 DIAGNOSIS — Z98.890 OTHER SPECIFIED POSTPROCEDURAL STATES: Chronic | ICD-10-CM

## 2017-12-12 DIAGNOSIS — D72.820 LYMPHOCYTOSIS (SYMPTOMATIC): ICD-10-CM

## 2017-12-12 LAB
ALBUMIN SERPL ELPH-MCNC: 3.7 G/DL — SIGNIFICANT CHANGE UP (ref 3.3–5)
ALP SERPL-CCNC: 82 U/L — SIGNIFICANT CHANGE UP (ref 30–120)
ALT FLD-CCNC: 27 U/L DA — SIGNIFICANT CHANGE UP (ref 10–60)
ANION GAP SERPL CALC-SCNC: 5 MMOL/L — SIGNIFICANT CHANGE UP (ref 5–17)
APTT BLD: 30.3 SEC — SIGNIFICANT CHANGE UP (ref 27.5–37.4)
AST SERPL-CCNC: 17 U/L — SIGNIFICANT CHANGE UP (ref 10–40)
BILIRUB SERPL-MCNC: 0.4 MG/DL — SIGNIFICANT CHANGE UP (ref 0.2–1.2)
BLD GP AB SCN SERPL QL: SIGNIFICANT CHANGE UP
BUN SERPL-MCNC: 18 MG/DL — SIGNIFICANT CHANGE UP (ref 7–23)
CALCIUM SERPL-MCNC: 9 MG/DL — SIGNIFICANT CHANGE UP (ref 8.4–10.5)
CHLORIDE SERPL-SCNC: 105 MMOL/L — SIGNIFICANT CHANGE UP (ref 96–108)
CO2 SERPL-SCNC: 30 MMOL/L — SIGNIFICANT CHANGE UP (ref 22–31)
CREAT SERPL-MCNC: 0.66 MG/DL — SIGNIFICANT CHANGE UP (ref 0.5–1.3)
GLUCOSE SERPL-MCNC: 104 MG/DL — HIGH (ref 70–99)
HCT VFR BLD CALC: 38.1 % — SIGNIFICANT CHANGE UP (ref 34.5–45)
HGB BLD-MCNC: 12.8 G/DL — SIGNIFICANT CHANGE UP (ref 11.5–15.5)
INR BLD: 0.96 RATIO — SIGNIFICANT CHANGE UP (ref 0.88–1.16)
MCHC RBC-ENTMCNC: 29.6 PG — SIGNIFICANT CHANGE UP (ref 27–34)
MCHC RBC-ENTMCNC: 33.6 GM/DL — SIGNIFICANT CHANGE UP (ref 32–36)
MCV RBC AUTO: 87.8 FL — SIGNIFICANT CHANGE UP (ref 80–100)
MRSA PCR RESULT.: SIGNIFICANT CHANGE UP
PLATELET # BLD AUTO: 211 K/UL — SIGNIFICANT CHANGE UP (ref 150–400)
POTASSIUM SERPL-MCNC: 3.6 MMOL/L — SIGNIFICANT CHANGE UP (ref 3.5–5.3)
POTASSIUM SERPL-SCNC: 3.6 MMOL/L — SIGNIFICANT CHANGE UP (ref 3.5–5.3)
PROT SERPL-MCNC: 7.8 G/DL — SIGNIFICANT CHANGE UP (ref 6–8.3)
PROTHROM AB SERPL-ACNC: 10.5 SEC — SIGNIFICANT CHANGE UP (ref 9.8–12.7)
RBC # BLD: 4.34 M/UL — SIGNIFICANT CHANGE UP (ref 3.8–5.2)
RBC # FLD: 12.5 % — SIGNIFICANT CHANGE UP (ref 10.3–14.5)
S AUREUS DNA NOSE QL NAA+PROBE: DETECTED
SODIUM SERPL-SCNC: 140 MMOL/L — SIGNIFICANT CHANGE UP (ref 135–145)
WBC # BLD: 6.9 K/UL — SIGNIFICANT CHANGE UP (ref 3.8–10.5)
WBC # FLD AUTO: 6.9 K/UL — SIGNIFICANT CHANGE UP (ref 3.8–10.5)

## 2017-12-12 PROCEDURE — 93010 ELECTROCARDIOGRAM REPORT: CPT | Mod: NC

## 2017-12-12 PROCEDURE — 80053 COMPREHEN METABOLIC PANEL: CPT

## 2017-12-12 PROCEDURE — 87640 STAPH A DNA AMP PROBE: CPT

## 2017-12-12 PROCEDURE — 86901 BLOOD TYPING SEROLOGIC RH(D): CPT

## 2017-12-12 PROCEDURE — 85730 THROMBOPLASTIN TIME PARTIAL: CPT

## 2017-12-12 PROCEDURE — 85027 COMPLETE CBC AUTOMATED: CPT

## 2017-12-12 PROCEDURE — G0463: CPT

## 2017-12-12 PROCEDURE — 85610 PROTHROMBIN TIME: CPT

## 2017-12-12 PROCEDURE — 86900 BLOOD TYPING SEROLOGIC ABO: CPT

## 2017-12-12 PROCEDURE — 93005 ELECTROCARDIOGRAM TRACING: CPT

## 2017-12-12 PROCEDURE — 86850 RBC ANTIBODY SCREEN: CPT
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
## 2017-12-12 NOTE — H&P PST ADULT - HISTORY OF PRESENT ILLNESS
61 yo female presents with "long history" of left knee pain.  Pt states that she was initially treated  with cortisone and gel injections, but pain in left knee remains constant and increases with activity.

## 2017-12-12 NOTE — H&P PST ADULT - MUSCULOSKELETAL
details… detailed exam no calf tenderness/decreased ROM due to pain/decreased ROM/left knee/diminished strength

## 2017-12-12 NOTE — H&P PST ADULT - PROBLEM SELECTOR PLAN 2
Pt states that she was recently informed that her lymphocyte count was "abnormal".  Pt is currently being worked up by a hematologist and will obtain clearance pre op.

## 2017-12-12 NOTE — H&P PST ADULT - ASSESSMENT
Pt presents to PST.  Pre op instructions reviewed and understood.  Medical and hematology clearances are scheduled for next week.  Pt c/o pain and weakness right thigh, will notify Dr Harper's office of same.

## 2017-12-12 NOTE — H&P PST ADULT - PMH
GERD (gastroesophageal reflux disease)    Hypothyroid    IBS (irritable bowel syndrome)    Knee pain, left    Lymphocytosis  currently being worked up  Osteoarthritis    Positional vertigo    Prosthetic eye globe  left eye

## 2017-12-12 NOTE — H&P PST ADULT - FAMILY HISTORY
Family history of lung cancer, MS mother     Father  Still living? No  Family history of liver cancer, Age at diagnosis: Age Unknown     Sibling  Still living? Yes, Estimated age: 51-60  Family history of lupus erythematosus, Age at diagnosis: Age Unknown     Child  Still living? Yes, Estimated age: 31-40  Family history of Crohn's disease, Age at diagnosis: Age Unknown

## 2017-12-13 RX ORDER — MUPIROCIN 20 MG/G
1 OINTMENT TOPICAL
Qty: 22 | Refills: 0 | OUTPATIENT
Start: 2017-12-13 | End: 2017-12-17

## 2017-12-13 RX ORDER — MUPIROCIN 20 MG/G
1 OINTMENT TOPICAL
Qty: 1 | Refills: 0 | OUTPATIENT
Start: 2017-12-13 | End: 2017-12-17

## 2017-12-13 NOTE — PROVIDER CONTACT NOTE (OTHER) - RECOMMENDATIONS
Ordered Mupirocin 2% intranasally BID x 5 Days preop Spoke w pt. reviewed use. Patient verbalized understanding

## 2017-12-19 RX ORDER — APREPITANT 80 MG/1
40 CAPSULE ORAL ONCE
Qty: 0 | Refills: 0 | Status: COMPLETED | OUTPATIENT
Start: 2017-12-27 | End: 2017-12-27

## 2017-12-22 RX ORDER — ONDANSETRON 8 MG/1
4 TABLET, FILM COATED ORAL EVERY 6 HOURS
Qty: 0 | Refills: 0 | Status: DISCONTINUED | OUTPATIENT
Start: 2017-12-27 | End: 2017-12-30

## 2017-12-22 RX ORDER — SENNA PLUS 8.6 MG/1
2 TABLET ORAL AT BEDTIME
Qty: 0 | Refills: 0 | Status: DISCONTINUED | OUTPATIENT
Start: 2017-12-27 | End: 2017-12-30

## 2017-12-22 RX ORDER — SODIUM CHLORIDE 9 MG/ML
1000 INJECTION, SOLUTION INTRAVENOUS
Qty: 0 | Refills: 0 | Status: DISCONTINUED | OUTPATIENT
Start: 2017-12-27 | End: 2017-12-30

## 2017-12-22 RX ORDER — PANTOPRAZOLE SODIUM 20 MG/1
40 TABLET, DELAYED RELEASE ORAL
Qty: 0 | Refills: 0 | Status: DISCONTINUED | OUTPATIENT
Start: 2017-12-28 | End: 2017-12-30

## 2017-12-22 RX ORDER — DOCUSATE SODIUM 100 MG
100 CAPSULE ORAL THREE TIMES A DAY
Qty: 0 | Refills: 0 | Status: DISCONTINUED | OUTPATIENT
Start: 2017-12-27 | End: 2017-12-30

## 2017-12-22 RX ORDER — POLYETHYLENE GLYCOL 3350 17 G/17G
17 POWDER, FOR SOLUTION ORAL DAILY
Qty: 0 | Refills: 0 | Status: DISCONTINUED | OUTPATIENT
Start: 2017-12-27 | End: 2017-12-30

## 2017-12-22 RX ORDER — MAGNESIUM HYDROXIDE 400 MG/1
30 TABLET, CHEWABLE ORAL DAILY
Qty: 0 | Refills: 0 | Status: DISCONTINUED | OUTPATIENT
Start: 2017-12-27 | End: 2017-12-30

## 2017-12-26 NOTE — PATIENT PROFILE ADULT. - VISION (WITH CORRECTIVE LENSES IF THE PATIENT USUALLY WEARS THEM):
Left eye prosthesis/Partially impaired: cannot see medication labels or newsprint, but can see obstacles in path, and the surrounding layout; can count fingers at arm's length
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
## 2017-12-27 ENCOUNTER — RESULT REVIEW (OUTPATIENT)
Age: 62
End: 2017-12-27

## 2017-12-27 ENCOUNTER — APPOINTMENT (OUTPATIENT)
Dept: ORTHOPEDIC SURGERY | Facility: HOSPITAL | Age: 62
End: 2017-12-27

## 2017-12-27 ENCOUNTER — INPATIENT (INPATIENT)
Facility: HOSPITAL | Age: 62
LOS: 2 days | Discharge: HOME | DRG: 470 | End: 2017-12-30
Attending: ORTHOPAEDIC SURGERY | Admitting: ORTHOPAEDIC SURGERY
Payer: COMMERCIAL

## 2017-12-27 VITALS
OXYGEN SATURATION: 100 % | WEIGHT: 243.83 LBS | DIASTOLIC BLOOD PRESSURE: 73 MMHG | SYSTOLIC BLOOD PRESSURE: 153 MMHG | TEMPERATURE: 98 F | RESPIRATION RATE: 20 BRPM | HEART RATE: 95 BPM | HEIGHT: 64 IN

## 2017-12-27 DIAGNOSIS — Z98.51 TUBAL LIGATION STATUS: Chronic | ICD-10-CM

## 2017-12-27 DIAGNOSIS — Z98.890 OTHER SPECIFIED POSTPROCEDURAL STATES: Chronic | ICD-10-CM

## 2017-12-27 DIAGNOSIS — Z90.49 ACQUIRED ABSENCE OF OTHER SPECIFIED PARTS OF DIGESTIVE TRACT: Chronic | ICD-10-CM

## 2017-12-27 DIAGNOSIS — S01.91XA LACERATION WITHOUT FOREIGN BODY OF UNSPECIFIED PART OF HEAD, INITIAL ENCOUNTER: Chronic | ICD-10-CM

## 2017-12-27 DIAGNOSIS — K21.9 GASTRO-ESOPHAGEAL REFLUX DISEASE WITHOUT ESOPHAGITIS: ICD-10-CM

## 2017-12-27 DIAGNOSIS — Z47.1 AFTERCARE FOLLOWING JOINT REPLACEMENT SURGERY: ICD-10-CM

## 2017-12-27 DIAGNOSIS — K58.8 OTHER IRRITABLE BOWEL SYNDROME: ICD-10-CM

## 2017-12-27 DIAGNOSIS — E03.9 HYPOTHYROIDISM, UNSPECIFIED: ICD-10-CM

## 2017-12-27 DIAGNOSIS — M17.12 UNILATERAL PRIMARY OSTEOARTHRITIS, LEFT KNEE: ICD-10-CM

## 2017-12-27 DIAGNOSIS — Z96.651 PRESENCE OF RIGHT ARTIFICIAL KNEE JOINT: Chronic | ICD-10-CM

## 2017-12-27 LAB
ANION GAP SERPL CALC-SCNC: 7 MMOL/L — SIGNIFICANT CHANGE UP (ref 5–17)
BUN SERPL-MCNC: 15 MG/DL — SIGNIFICANT CHANGE UP (ref 7–23)
CALCIUM SERPL-MCNC: 8.6 MG/DL — SIGNIFICANT CHANGE UP (ref 8.4–10.5)
CHLORIDE SERPL-SCNC: 107 MMOL/L — SIGNIFICANT CHANGE UP (ref 96–108)
CO2 SERPL-SCNC: 27 MMOL/L — SIGNIFICANT CHANGE UP (ref 22–31)
CREAT SERPL-MCNC: 0.74 MG/DL — SIGNIFICANT CHANGE UP (ref 0.5–1.3)
GLUCOSE SERPL-MCNC: 187 MG/DL — HIGH (ref 70–99)
HCT VFR BLD CALC: 36.7 % — SIGNIFICANT CHANGE UP (ref 34.5–45)
HGB BLD-MCNC: 11.8 G/DL — SIGNIFICANT CHANGE UP (ref 11.5–15.5)
POTASSIUM SERPL-MCNC: 3.9 MMOL/L — SIGNIFICANT CHANGE UP (ref 3.5–5.3)
POTASSIUM SERPL-SCNC: 3.9 MMOL/L — SIGNIFICANT CHANGE UP (ref 3.5–5.3)
SODIUM SERPL-SCNC: 141 MMOL/L — SIGNIFICANT CHANGE UP (ref 135–145)

## 2017-12-27 PROCEDURE — 88311 DECALCIFY TISSUE: CPT | Mod: 26

## 2017-12-27 PROCEDURE — 27447 TOTAL KNEE ARTHROPLASTY: CPT | Mod: LT

## 2017-12-27 PROCEDURE — 73562 X-RAY EXAM OF KNEE 3: CPT | Mod: 26,LT

## 2017-12-27 PROCEDURE — 99223 1ST HOSP IP/OBS HIGH 75: CPT

## 2017-12-27 PROCEDURE — 88305 TISSUE EXAM BY PATHOLOGIST: CPT | Mod: 26

## 2017-12-27 RX ORDER — APIXABAN 2.5 MG/1
1 TABLET, FILM COATED ORAL
Qty: 20 | Refills: 0 | OUTPATIENT
Start: 2017-12-27 | End: 2018-01-05

## 2017-12-27 RX ORDER — TRAMADOL HYDROCHLORIDE 50 MG/1
100 TABLET ORAL EVERY 6 HOURS
Qty: 0 | Refills: 0 | Status: DISCONTINUED | OUTPATIENT
Start: 2017-12-27 | End: 2017-12-30

## 2017-12-27 RX ORDER — HYDROMORPHONE HYDROCHLORIDE 2 MG/ML
0.5 INJECTION INTRAMUSCULAR; INTRAVENOUS; SUBCUTANEOUS
Qty: 0 | Refills: 0 | Status: DISCONTINUED | OUTPATIENT
Start: 2017-12-27 | End: 2017-12-27

## 2017-12-27 RX ORDER — HYOSCYAMINE SULFATE 0.13 MG
1 TABLET ORAL
Qty: 0 | Refills: 0 | COMMUNITY

## 2017-12-27 RX ORDER — HYDROMORPHONE HYDROCHLORIDE 2 MG/ML
0.5 INJECTION INTRAMUSCULAR; INTRAVENOUS; SUBCUTANEOUS
Qty: 0 | Refills: 0 | Status: DISCONTINUED | OUTPATIENT
Start: 2017-12-27 | End: 2017-12-30

## 2017-12-27 RX ORDER — LIOTHYRONINE SODIUM 25 UG/1
25 TABLET ORAL DAILY
Qty: 0 | Refills: 0 | Status: DISCONTINUED | OUTPATIENT
Start: 2017-12-28 | End: 2017-12-30

## 2017-12-27 RX ORDER — ACETAMINOPHEN 500 MG
1000 TABLET ORAL EVERY 8 HOURS
Qty: 0 | Refills: 0 | Status: DISCONTINUED | OUTPATIENT
Start: 2017-12-28 | End: 2017-12-30

## 2017-12-27 RX ORDER — TRAMADOL HYDROCHLORIDE 50 MG/1
1 TABLET ORAL
Qty: 42 | Refills: 0 | OUTPATIENT
Start: 2017-12-27

## 2017-12-27 RX ORDER — CEFAZOLIN SODIUM 1 G
2000 VIAL (EA) INJECTION ONCE
Qty: 0 | Refills: 0 | Status: COMPLETED | OUTPATIENT
Start: 2017-12-27 | End: 2017-12-27

## 2017-12-27 RX ORDER — MECLIZINE HCL 12.5 MG
25 TABLET ORAL
Qty: 0 | Refills: 0 | Status: DISCONTINUED | OUTPATIENT
Start: 2017-12-27 | End: 2017-12-30

## 2017-12-27 RX ORDER — AMITRIPTYLINE HCL 25 MG
50 TABLET ORAL AT BEDTIME
Qty: 0 | Refills: 0 | Status: DISCONTINUED | OUTPATIENT
Start: 2017-12-27 | End: 2017-12-30

## 2017-12-27 RX ORDER — TRAMADOL HYDROCHLORIDE 50 MG/1
50 TABLET ORAL EVERY 4 HOURS
Qty: 0 | Refills: 0 | Status: DISCONTINUED | OUTPATIENT
Start: 2017-12-27 | End: 2017-12-30

## 2017-12-27 RX ORDER — CEFAZOLIN SODIUM 1 G
2000 VIAL (EA) INJECTION EVERY 8 HOURS
Qty: 0 | Refills: 0 | Status: COMPLETED | OUTPATIENT
Start: 2017-12-27 | End: 2017-12-28

## 2017-12-27 RX ORDER — TRANEXAMIC ACID 100 MG/ML
1000 INJECTION, SOLUTION INTRAVENOUS ONCE
Qty: 0 | Refills: 0 | Status: COMPLETED | OUTPATIENT
Start: 2017-12-27 | End: 2017-12-27

## 2017-12-27 RX ORDER — APIXABAN 2.5 MG/1
2.5 TABLET, FILM COATED ORAL EVERY 12 HOURS
Qty: 0 | Refills: 0 | Status: DISCONTINUED | OUTPATIENT
Start: 2017-12-28 | End: 2017-12-30

## 2017-12-27 RX ORDER — ACETAMINOPHEN 500 MG
1000 TABLET ORAL ONCE
Qty: 0 | Refills: 0 | Status: COMPLETED | OUTPATIENT
Start: 2017-12-27 | End: 2017-12-27

## 2017-12-27 RX ORDER — HYOSCYAMINE SULFATE 0.13 MG
0.38 TABLET ORAL DAILY
Qty: 0 | Refills: 0 | Status: DISCONTINUED | OUTPATIENT
Start: 2017-12-28 | End: 2017-12-30

## 2017-12-27 RX ORDER — SODIUM CHLORIDE 9 MG/ML
1000 INJECTION, SOLUTION INTRAVENOUS
Qty: 0 | Refills: 0 | Status: DISCONTINUED | OUTPATIENT
Start: 2017-12-27 | End: 2017-12-27

## 2017-12-27 RX ORDER — LEVOTHYROXINE SODIUM 125 MCG
100 TABLET ORAL DAILY
Qty: 0 | Refills: 0 | Status: DISCONTINUED | OUTPATIENT
Start: 2017-12-28 | End: 2017-12-30

## 2017-12-27 RX ORDER — ONDANSETRON 8 MG/1
4 TABLET, FILM COATED ORAL ONCE
Qty: 0 | Refills: 0 | Status: DISCONTINUED | OUTPATIENT
Start: 2017-12-27 | End: 2017-12-27

## 2017-12-27 RX ORDER — ACETAMINOPHEN 500 MG
1000 TABLET ORAL EVERY 6 HOURS
Qty: 0 | Refills: 0 | Status: COMPLETED | OUTPATIENT
Start: 2017-12-27 | End: 2017-12-28

## 2017-12-27 RX ADMIN — TRAMADOL HYDROCHLORIDE 50 MILLIGRAM(S): 50 TABLET ORAL at 21:56

## 2017-12-27 RX ADMIN — Medication 400 MILLIGRAM(S): at 16:41

## 2017-12-27 RX ADMIN — Medication 100 MILLIGRAM(S): at 17:28

## 2017-12-27 RX ADMIN — TRAMADOL HYDROCHLORIDE 50 MILLIGRAM(S): 50 TABLET ORAL at 22:20

## 2017-12-27 RX ADMIN — Medication 25 MILLIGRAM(S): at 17:30

## 2017-12-27 RX ADMIN — Medication 400 MILLIGRAM(S): at 21:56

## 2017-12-27 RX ADMIN — Medication 1000 MILLIGRAM(S): at 21:59

## 2017-12-27 RX ADMIN — APREPITANT 40 MILLIGRAM(S): 80 CAPSULE ORAL at 07:02

## 2017-12-27 RX ADMIN — SODIUM CHLORIDE 100 MILLILITER(S): 9 INJECTION, SOLUTION INTRAVENOUS at 16:41

## 2017-12-27 RX ADMIN — SODIUM CHLORIDE 100 MILLILITER(S): 9 INJECTION, SOLUTION INTRAVENOUS at 11:59

## 2017-12-27 RX ADMIN — Medication 50 MILLIGRAM(S): at 20:51

## 2017-12-27 RX ADMIN — Medication 1000 MILLIGRAM(S): at 17:24

## 2017-12-27 NOTE — OCCUPATIONAL THERAPY INITIAL EVALUATION ADULT - GENERAL OBSERVATIONS, REHAB EVAL
Pt found in bed , IV, PAS, supplemental 02 Pt found in bed , IV, PAS, supplemental 02, Knee immobilizer Pt found in bed , IV, PAS, supplemental 02,

## 2017-12-27 NOTE — CONSULT NOTE ADULT - PROBLEM SELECTOR RECOMMENDATION 9
DVT ppx: [ ]ASA81 [ ] [id number] [ ] Lovenox [ ] Coumadin   [ x] Eliquis [  ] Heparin  pain meds -tramadol.  PT/OT.    Dispo:     Home [ ]     Acute Rehab [ ]     [facility] [ ]     TBD [] DVT ppx: [ ]ASA81 [ ] [id number] [ ] Lovenox [ ] Coumadin   [ x] Eliquis [  ] Heparin  pain meds -tramadol.  PT/OT.    Dispo:     Home [ x]     Acute Rehab [ ]     [facility] [ ]     TBD []

## 2017-12-27 NOTE — PHYSICAL THERAPY INITIAL EVALUATION ADULT - RANGE OF MOTION EXAMINATION, REHAB EVAL
Right LE ROM was WFL (within functional limits)/bilateral upper extremity ROM was WNL (within normal limits)/deficits as listed below/left LE n/t due to recent sx.

## 2017-12-27 NOTE — OCCUPATIONAL THERAPY INITIAL EVALUATION ADULT - PRECAUTIONS/LIMITATIONS, REHAB EVAL
fall precautions/surgical precautions As per Shirlene Welsh, RN + MD order for Knee immobilizer left knee for today secondary to +Adductor canal block, + spinal anesthesia/ numbness/fall precautions/surgical precautions

## 2017-12-27 NOTE — BRIEF OPERATIVE NOTE - PROCEDURE
<<-----Click on this checkbox to enter Procedure Knee replacement  12/27/2017  LEFT knee  Active  Ele Beck

## 2017-12-27 NOTE — OCCUPATIONAL THERAPY INITIAL EVALUATION ADULT - ADDITIONAL COMMENTS
Live with her . no steps to enter. 12 inside with handrail. Has a stall shower. Own a RW, SAC,  + Commode Live with her . no steps to enter. 12 inside 1 HR. Has a stall shower. Own a RW, SAC,  + Commode Live with her . no steps to enter. 12 inside 1 HR. Has a stall shower. Own a RW, SAC,  + Commode. Spouse states that if needed pt can stay on 1st level

## 2017-12-27 NOTE — PHYSICAL THERAPY INITIAL EVALUATION ADULT - ADDITIONAL COMMENTS
Pt lives in private home with no SHELBY and 12 steps to bedroom + HR. Pt owns SAC and RW from previous surgery
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
## 2017-12-27 NOTE — OCCUPATIONAL THERAPY INITIAL EVALUATION ADULT - ASSISTIVE DEVICE FOR TRANSFER: SIT/STAND, REHAB EVAL
wide HD RW, + left Knee immobilizer + manual assist needed with + buckling left knee, unable to take steps. wide HD RW, + left Knee immobilizer + manual assist needed with + buckling left knee, unable to take steps. Shirlene Welsh RN notified wide HD RW, + left Knee immobilizer +3rd person needed to manual assist + buckling left knee, unable to take steps. Shirlene Welsh RN notified

## 2017-12-27 NOTE — OCCUPATIONAL THERAPY INITIAL EVALUATION ADULT - ORIENTATION, REHAB EVAL
oriented to person, place, time and situation/Patient educated verbally regarding role of OT, LE weight bearing status & pt. provided with education folder including functional exercises, TKR education & caregiver guide pamphlet.

## 2017-12-27 NOTE — PHYSICAL THERAPY INITIAL EVALUATION ADULT - MD ORDER
PT evaluation and treat.  verbal orders rec'd from stevo TREVIÑO regarding use of knee immobilizer left lower extremity for pod 0 for oob activity

## 2017-12-28 ENCOUNTER — TRANSCRIPTION ENCOUNTER (OUTPATIENT)
Age: 62
End: 2017-12-28

## 2017-12-28 DIAGNOSIS — D50.0 IRON DEFICIENCY ANEMIA SECONDARY TO BLOOD LOSS (CHRONIC): ICD-10-CM

## 2017-12-28 LAB
ANION GAP SERPL CALC-SCNC: 7 MMOL/L — SIGNIFICANT CHANGE UP (ref 5–17)
BUN SERPL-MCNC: 9 MG/DL — SIGNIFICANT CHANGE UP (ref 7–23)
CALCIUM SERPL-MCNC: 8.4 MG/DL — SIGNIFICANT CHANGE UP (ref 8.4–10.5)
CHLORIDE SERPL-SCNC: 106 MMOL/L — SIGNIFICANT CHANGE UP (ref 96–108)
CO2 SERPL-SCNC: 28 MMOL/L — SIGNIFICANT CHANGE UP (ref 22–31)
CREAT SERPL-MCNC: 0.7 MG/DL — SIGNIFICANT CHANGE UP (ref 0.5–1.3)
GLUCOSE SERPL-MCNC: 104 MG/DL — HIGH (ref 70–99)
HCT VFR BLD CALC: 31.6 % — LOW (ref 34.5–45)
HGB BLD-MCNC: 10.4 G/DL — LOW (ref 11.5–15.5)
MCHC RBC-ENTMCNC: 29.2 PG — SIGNIFICANT CHANGE UP (ref 27–34)
MCHC RBC-ENTMCNC: 33 GM/DL — SIGNIFICANT CHANGE UP (ref 32–36)
MCV RBC AUTO: 88.6 FL — SIGNIFICANT CHANGE UP (ref 80–100)
PLATELET # BLD AUTO: 166 K/UL — SIGNIFICANT CHANGE UP (ref 150–400)
POTASSIUM SERPL-MCNC: 4 MMOL/L — SIGNIFICANT CHANGE UP (ref 3.5–5.3)
POTASSIUM SERPL-SCNC: 4 MMOL/L — SIGNIFICANT CHANGE UP (ref 3.5–5.3)
RBC # BLD: 3.56 M/UL — LOW (ref 3.8–5.2)
RBC # FLD: 12.6 % — SIGNIFICANT CHANGE UP (ref 10.3–14.5)
SODIUM SERPL-SCNC: 141 MMOL/L — SIGNIFICANT CHANGE UP (ref 135–145)
WBC # BLD: 9.4 K/UL — SIGNIFICANT CHANGE UP (ref 3.8–10.5)
WBC # FLD AUTO: 9.4 K/UL — SIGNIFICANT CHANGE UP (ref 3.8–10.5)

## 2017-12-28 PROCEDURE — 99233 SBSQ HOSP IP/OBS HIGH 50: CPT

## 2017-12-28 RX ORDER — MELOXICAM 15 MG/1
15 TABLET ORAL DAILY
Qty: 0 | Refills: 0 | Status: DISCONTINUED | OUTPATIENT
Start: 2017-12-28 | End: 2017-12-30

## 2017-12-28 RX ORDER — PANTOPRAZOLE SODIUM 20 MG/1
1 TABLET, DELAYED RELEASE ORAL
Qty: 30 | Refills: 1 | OUTPATIENT
Start: 2017-12-28 | End: 2018-02-25

## 2017-12-28 RX ADMIN — APIXABAN 2.5 MILLIGRAM(S): 2.5 TABLET, FILM COATED ORAL at 08:49

## 2017-12-28 RX ADMIN — Medication 1000 MILLIGRAM(S): at 12:14

## 2017-12-28 RX ADMIN — Medication 25 MILLIGRAM(S): at 05:46

## 2017-12-28 RX ADMIN — Medication 1000 MILLIGRAM(S): at 04:07

## 2017-12-28 RX ADMIN — PANTOPRAZOLE SODIUM 40 MILLIGRAM(S): 20 TABLET, DELAYED RELEASE ORAL at 05:46

## 2017-12-28 RX ADMIN — TRAMADOL HYDROCHLORIDE 50 MILLIGRAM(S): 50 TABLET ORAL at 12:30

## 2017-12-28 RX ADMIN — Medication 400 MILLIGRAM(S): at 04:07

## 2017-12-28 RX ADMIN — TRAMADOL HYDROCHLORIDE 50 MILLIGRAM(S): 50 TABLET ORAL at 21:04

## 2017-12-28 RX ADMIN — Medication 50 MILLIGRAM(S): at 21:04

## 2017-12-28 RX ADMIN — Medication 1000 MILLIGRAM(S): at 18:05

## 2017-12-28 RX ADMIN — TRAMADOL HYDROCHLORIDE 50 MILLIGRAM(S): 50 TABLET ORAL at 08:50

## 2017-12-28 RX ADMIN — Medication 25 MILLIGRAM(S): at 21:04

## 2017-12-28 RX ADMIN — TRAMADOL HYDROCHLORIDE 50 MILLIGRAM(S): 50 TABLET ORAL at 13:00

## 2017-12-28 RX ADMIN — TRAMADOL HYDROCHLORIDE 50 MILLIGRAM(S): 50 TABLET ORAL at 21:57

## 2017-12-28 RX ADMIN — MELOXICAM 15 MILLIGRAM(S): 15 TABLET ORAL at 12:14

## 2017-12-28 RX ADMIN — LIOTHYRONINE SODIUM 25 MICROGRAM(S): 25 TABLET ORAL at 05:46

## 2017-12-28 RX ADMIN — TRAMADOL HYDROCHLORIDE 50 MILLIGRAM(S): 50 TABLET ORAL at 09:38

## 2017-12-28 RX ADMIN — Medication 100 MICROGRAM(S): at 05:46

## 2017-12-28 RX ADMIN — Medication 100 MILLIGRAM(S): at 00:35

## 2017-12-28 RX ADMIN — APIXABAN 2.5 MILLIGRAM(S): 2.5 TABLET, FILM COATED ORAL at 21:04

## 2017-12-28 RX ADMIN — Medication 0.38 MILLIGRAM(S): at 12:14

## 2017-12-28 NOTE — DISCHARGE NOTE ADULT - VISION (WITH CORRECTIVE LENSES IF THE PATIENT USUALLY WEARS THEM):
Partially impaired: cannot see medication labels or newsprint, but can see obstacles in path, and the surrounding layout; can count fingers at arm's length/Left eye prosthesis

## 2017-12-28 NOTE — DISCHARGE NOTE ADULT - MEDICATION SUMMARY - MEDICATIONS TO TAKE
I will START or STAY ON the medications listed below when I get home from the hospital:    traMADol 50 mg oral tablet  -- 1 tab(s) by mouth every 4 hours, As Needed -for moderate pain MDD:6  -- Caution federal law prohibits the transfer of this drug to any person other  than the person for whom it was prescribed.  May cause drowsiness.  Alcohol may intensify this effect.  Use care when operating dangerous machinery.  Obtain medical advice before taking any non-prescription drugs as some may affect the action of this medication.    -- Indication: For Acute Pain    acetaminophen 500 mg oral tablet  -- 2 tab(s) by mouth every 8 hours  -- Indication: For Pain regimen    meloxicam 15 mg oral tablet  -- 1 tab(s) by mouth once a day  -- Indication: For Pain    apixaban 2.5 mg oral tablet  -- 1 tab(s) by mouth every 12 hours  -- Indication: For DVT prevention    amitriptyline 50 mg oral tablet  -- 1 tab(s) by mouth once a day (at bedtime)  -- Indication: For Depression    meclizine 25 mg oral tablet  -- 1 tab(s) by mouth 2 times a day  -- Indication: For Vertigo    hyoscyamine 0.375 mg dual-release oral tablet, extended release  -- 1  by mouth once a day  -- Indication: For IBS    senna oral tablet  -- 2 tab(s) by mouth once a day (at bedtime)  -- Indication: For Constipation    pantoprazole 40 mg oral delayed release tablet  -- 1 tab(s) by mouth once a day (before a meal)  -- Indication: For GERD (gastroesophageal reflux disease)    levothyroxine 100 mcg (0.1 mg) oral tablet  -- 1 tab(s) by mouth once a day  -- Indication: For Thyroid    liothyronine 25 mcg oral tablet  -- 1 tab(s) by mouth once a day  -- Indication: For Thyroid

## 2017-12-28 NOTE — DISCHARGE NOTE ADULT - CARE PLAN
Principal Discharge DX:	S/P TKR (total knee replacement) using cement, left  Goal:	Improve quality of life  Instructions for follow-up, activity and diet:	Your new total knee requires proper care.  Your care provider is your best resource for care information.  Please follow these basic guidelines.  Use pain medication if needed as prescribed.  Ice packs are a helpful addition to your comfort.  Applying a  waterproof ice pack over a cloth or thin towel to the site for 30 minutes per hour may provide benefit by reducing swelling and discomfort.  Your Physical Therapy/Occupational Therapy may include ambulation, transfers, stairs, ADL's (activities of daily living), range of motion exercises, and isometrics.  Your participation is vital to your recovery.    -Your Activity  • Weight Bearing as tolerated with rolling walker.  • Take short, frequent walks increasing the distance that you walk each day as tolerated.  • Change your position every hour to decrease pain and stiffness.  • Continue the exercises taught to you by your physical therapist.  • No driving until cleared by the doctor.  • No tub baths, hot tubs, or swimming pools until instructed by your doctor.  • Do not squat down on the floor.  • Do not kneel or twist your knee.    Keep incision clean and dry. Change the dressing daily if there is drainage noted from surgical wound. When there is no drainage, the wound may be left open to air.  Salves, ointments or other topical treatments are not to be used on the wound unless specifically recommended by your doctor.   If you have sutures (stiches) and no drainage form the incision you may shower allowing water to rinse the incision and pat it  dry afterward with a clean towel.  If you have Prineo (tape/glue) you may shower and pat the wound dry.  Prineo removal is done in the office 2 weeks after surgery.    If you have further questions or problems call your doctors office.  Instructions for follow-up, activity and diet:	Call your doctor if you experience:  • An increase in pain not controlled by pain medication or change in activity or  position.  • Temperature greater than 101° F.  • Redness, increased swelling or foul smelling drainage from or around the  incision.  • Numbness, tingling or a change in color or temperature of the operative leg.  • Call your doctor immediately if you experience chest pain, shortness of breath or calf pain.    For Constipation :   • Increase your water intake. Drink at least 8 glasses of water daily.  • Try adding fiber to your diet by eating fruits, vegetables and foods that are rich in grains.  • If you do experience constipation, you may take an over-the-counter stool softener/laxative such as Colace, Senokot or Milk of Magnesia.

## 2017-12-28 NOTE — DISCHARGE NOTE ADULT - CARE PROVIDER_API CALL
Savanna Harper), Orthopedics  [address] [place] [address]  Phone: [phone]  Fax: [phone]

## 2017-12-28 NOTE — DISCHARGE NOTE ADULT - MEDICATION SUMMARY - MEDICATIONS TO STOP TAKING
I will STOP taking the medications listed below when I get home from the hospital:    Bactroban 2% nasal ointment  -- 1 application into nose 2 times a day   -- For the nose.

## 2017-12-28 NOTE — DISCHARGE NOTE ADULT - PATIENT PORTAL LINK FT
“You can access the [facility] Patient Portal, offered by [facility], by registering with the following website: [email or web address]”

## 2017-12-28 NOTE — DISCHARGE NOTE ADULT - HOSPITAL COURSE
FERMIN CRAMER    Admitted on 12-27-17     62y y.o.  Female with history of DJD (degenerative joint disease) of knee: LEFT knee    Surgery: Left  Knee replacement    Orthopedic Surgeon:   Dr. CHANCE Harper    Ute-operative antibiotic:    ceFAZolin   IVPB:,       Consulted Services : Hospitalist, Physical Therapy, Occupational Therapy    Typical Physical & occupational therapy modalities post Knee replacement   were performed including ambulation training, range of motion, ADL's, and transfers.     DVT prophylaxis:  apixaban: 2.5 milliGRAM(s)       The patient had a clean appearing surgical incision with no sign of surgical site infections and had a stable neuro / vascular exam of the operated extremity.  After progression of mobility guided by the PT/ OT staff,  the patient was felt to benefit from further rehabilitative care for restoration to level of function. This was felt to best be accomplished at Home.    Discharge and Orthopedic Care instructions were delineated in the Discharge Plan and reviewed with the patient. All medications were delineated in the medication reconciliation tool and key points were reviewed with the patient.     This patient was deemed stable from an Orthopedic & medical standpoint for discharge today.  She will follow up with Dr. CHANCE Harper for further Orthopedic care.     Patient Discharged with Following prescriptions:    apixaban 2.5 mg oral tablet: 1 tab(s) orally every 12 hours  pantoprazole 40 mg oral delayed release tablet: 1 tab(s) orally once a day (before a meal)  traMADol 50 mg oral tablet: 1 tab(s) orally every 4 hours, As Needed -for moderate pain MDD:6

## 2017-12-29 LAB
ANION GAP SERPL CALC-SCNC: 4 MMOL/L — LOW (ref 5–17)
BUN SERPL-MCNC: 10 MG/DL — SIGNIFICANT CHANGE UP (ref 7–23)
CALCIUM SERPL-MCNC: 8.2 MG/DL — LOW (ref 8.4–10.5)
CHLORIDE SERPL-SCNC: 107 MMOL/L — SIGNIFICANT CHANGE UP (ref 96–108)
CO2 SERPL-SCNC: 30 MMOL/L — SIGNIFICANT CHANGE UP (ref 22–31)
CREAT SERPL-MCNC: 0.73 MG/DL — SIGNIFICANT CHANGE UP (ref 0.5–1.3)
GLUCOSE SERPL-MCNC: 93 MG/DL — SIGNIFICANT CHANGE UP (ref 70–99)
HCT VFR BLD CALC: 32.6 % — LOW (ref 34.5–45)
HGB BLD-MCNC: 10.3 G/DL — LOW (ref 11.5–15.5)
MCHC RBC-ENTMCNC: 28.1 PG — SIGNIFICANT CHANGE UP (ref 27–34)
MCHC RBC-ENTMCNC: 31.5 GM/DL — LOW (ref 32–36)
MCV RBC AUTO: 89.2 FL — SIGNIFICANT CHANGE UP (ref 80–100)
PLATELET # BLD AUTO: 174 K/UL — SIGNIFICANT CHANGE UP (ref 150–400)
POTASSIUM SERPL-MCNC: 3.6 MMOL/L — SIGNIFICANT CHANGE UP (ref 3.5–5.3)
POTASSIUM SERPL-SCNC: 3.6 MMOL/L — SIGNIFICANT CHANGE UP (ref 3.5–5.3)
RBC # BLD: 3.65 M/UL — LOW (ref 3.8–5.2)
RBC # FLD: 13.1 % — SIGNIFICANT CHANGE UP (ref 10.3–14.5)
SODIUM SERPL-SCNC: 141 MMOL/L — SIGNIFICANT CHANGE UP (ref 135–145)
WBC # BLD: 8.5 K/UL — SIGNIFICANT CHANGE UP (ref 3.8–10.5)
WBC # FLD AUTO: 8.5 K/UL — SIGNIFICANT CHANGE UP (ref 3.8–10.5)

## 2017-12-29 PROCEDURE — 99233 SBSQ HOSP IP/OBS HIGH 50: CPT

## 2017-12-29 RX ORDER — MELOXICAM 15 MG/1
1 TABLET ORAL
Qty: 0 | Refills: 0 | COMMUNITY
Start: 2017-12-29

## 2017-12-29 RX ORDER — ACETAMINOPHEN 500 MG
2 TABLET ORAL
Qty: 0 | Refills: 0 | COMMUNITY
Start: 2017-12-29

## 2017-12-29 RX ORDER — BACITRACIN ZINC 500 UNIT/G
1 OINTMENT IN PACKET (EA) TOPICAL
Qty: 0 | Refills: 0 | Status: DISCONTINUED | OUTPATIENT
Start: 2017-12-29 | End: 2017-12-29

## 2017-12-29 RX ORDER — OMEPRAZOLE 10 MG/1
1 CAPSULE, DELAYED RELEASE ORAL
Qty: 0 | Refills: 0 | COMMUNITY

## 2017-12-29 RX ORDER — SENNA PLUS 8.6 MG/1
2 TABLET ORAL
Qty: 0 | Refills: 0 | COMMUNITY
Start: 2017-12-29

## 2017-12-29 RX ORDER — MELOXICAM 15 MG/1
1 TABLET ORAL
Qty: 0 | Refills: 0 | COMMUNITY

## 2017-12-29 RX ADMIN — MELOXICAM 15 MILLIGRAM(S): 15 TABLET ORAL at 12:00

## 2017-12-29 RX ADMIN — TRAMADOL HYDROCHLORIDE 50 MILLIGRAM(S): 50 TABLET ORAL at 08:54

## 2017-12-29 RX ADMIN — Medication 0.38 MILLIGRAM(S): at 11:31

## 2017-12-29 RX ADMIN — LIOTHYRONINE SODIUM 25 MICROGRAM(S): 25 TABLET ORAL at 06:21

## 2017-12-29 RX ADMIN — Medication 25 MILLIGRAM(S): at 06:21

## 2017-12-29 RX ADMIN — Medication 1000 MILLIGRAM(S): at 18:04

## 2017-12-29 RX ADMIN — Medication 1000 MILLIGRAM(S): at 17:44

## 2017-12-29 RX ADMIN — TRAMADOL HYDROCHLORIDE 50 MILLIGRAM(S): 50 TABLET ORAL at 08:19

## 2017-12-29 RX ADMIN — Medication 1000 MILLIGRAM(S): at 10:09

## 2017-12-29 RX ADMIN — MELOXICAM 15 MILLIGRAM(S): 15 TABLET ORAL at 11:31

## 2017-12-29 RX ADMIN — Medication 100 MICROGRAM(S): at 06:21

## 2017-12-29 RX ADMIN — Medication 50 MILLIGRAM(S): at 21:15

## 2017-12-29 RX ADMIN — Medication 1000 MILLIGRAM(S): at 03:57

## 2017-12-29 RX ADMIN — TRAMADOL HYDROCHLORIDE 50 MILLIGRAM(S): 50 TABLET ORAL at 21:15

## 2017-12-29 RX ADMIN — Medication 1000 MILLIGRAM(S): at 03:12

## 2017-12-29 RX ADMIN — Medication 1000 MILLIGRAM(S): at 10:30

## 2017-12-29 RX ADMIN — TRAMADOL HYDROCHLORIDE 50 MILLIGRAM(S): 50 TABLET ORAL at 22:00

## 2017-12-29 RX ADMIN — Medication 25 MILLIGRAM(S): at 17:44

## 2017-12-29 RX ADMIN — PANTOPRAZOLE SODIUM 40 MILLIGRAM(S): 20 TABLET, DELAYED RELEASE ORAL at 06:21

## 2017-12-29 RX ADMIN — TRAMADOL HYDROCHLORIDE 50 MILLIGRAM(S): 50 TABLET ORAL at 17:00

## 2017-12-29 RX ADMIN — APIXABAN 2.5 MILLIGRAM(S): 2.5 TABLET, FILM COATED ORAL at 21:15

## 2017-12-29 RX ADMIN — APIXABAN 2.5 MILLIGRAM(S): 2.5 TABLET, FILM COATED ORAL at 09:13

## 2017-12-29 RX ADMIN — TRAMADOL HYDROCHLORIDE 50 MILLIGRAM(S): 50 TABLET ORAL at 16:12

## 2017-12-30 VITALS
DIASTOLIC BLOOD PRESSURE: 73 MMHG | OXYGEN SATURATION: 95 % | TEMPERATURE: 98 F | HEART RATE: 74 BPM | SYSTOLIC BLOOD PRESSURE: 141 MMHG | RESPIRATION RATE: 17 BRPM

## 2017-12-30 LAB
ANION GAP SERPL CALC-SCNC: 5 MMOL/L — SIGNIFICANT CHANGE UP (ref 5–17)
BUN SERPL-MCNC: 10 MG/DL — SIGNIFICANT CHANGE UP (ref 7–23)
CALCIUM SERPL-MCNC: 8.2 MG/DL — LOW (ref 8.4–10.5)
CHLORIDE SERPL-SCNC: 109 MMOL/L — HIGH (ref 96–108)
CO2 SERPL-SCNC: 27 MMOL/L — SIGNIFICANT CHANGE UP (ref 22–31)
CREAT SERPL-MCNC: 0.64 MG/DL — SIGNIFICANT CHANGE UP (ref 0.5–1.3)
GLUCOSE SERPL-MCNC: 99 MG/DL — SIGNIFICANT CHANGE UP (ref 70–99)
HCT VFR BLD CALC: 30.1 % — LOW (ref 34.5–45)
HGB BLD-MCNC: 10 G/DL — LOW (ref 11.5–15.5)
MCHC RBC-ENTMCNC: 28.7 PG — SIGNIFICANT CHANGE UP (ref 27–34)
MCHC RBC-ENTMCNC: 33.1 GM/DL — SIGNIFICANT CHANGE UP (ref 32–36)
MCV RBC AUTO: 86.5 FL — SIGNIFICANT CHANGE UP (ref 80–100)
PLATELET # BLD AUTO: 162 K/UL — SIGNIFICANT CHANGE UP (ref 150–400)
POTASSIUM SERPL-MCNC: 3.9 MMOL/L — SIGNIFICANT CHANGE UP (ref 3.5–5.3)
POTASSIUM SERPL-SCNC: 3.9 MMOL/L — SIGNIFICANT CHANGE UP (ref 3.5–5.3)
RBC # BLD: 3.48 M/UL — LOW (ref 3.8–5.2)
RBC # FLD: 13 % — SIGNIFICANT CHANGE UP (ref 10.3–14.5)
SODIUM SERPL-SCNC: 141 MMOL/L — SIGNIFICANT CHANGE UP (ref 135–145)
WBC # BLD: 7.8 K/UL — SIGNIFICANT CHANGE UP (ref 3.8–10.5)
WBC # FLD AUTO: 7.8 K/UL — SIGNIFICANT CHANGE UP (ref 3.8–10.5)

## 2017-12-30 PROCEDURE — 99239 HOSP IP/OBS DSCHRG MGMT >30: CPT

## 2017-12-30 PROCEDURE — C1776: CPT

## 2017-12-30 PROCEDURE — 97110 THERAPEUTIC EXERCISES: CPT

## 2017-12-30 PROCEDURE — 85027 COMPLETE CBC AUTOMATED: CPT

## 2017-12-30 PROCEDURE — 88305 TISSUE EXAM BY PATHOLOGIST: CPT

## 2017-12-30 PROCEDURE — 94664 DEMO&/EVAL PT USE INHALER: CPT

## 2017-12-30 PROCEDURE — 73562 X-RAY EXAM OF KNEE 3: CPT

## 2017-12-30 PROCEDURE — 97161 PT EVAL LOW COMPLEX 20 MIN: CPT

## 2017-12-30 PROCEDURE — 85018 HEMOGLOBIN: CPT

## 2017-12-30 PROCEDURE — 97530 THERAPEUTIC ACTIVITIES: CPT

## 2017-12-30 PROCEDURE — 97165 OT EVAL LOW COMPLEX 30 MIN: CPT

## 2017-12-30 PROCEDURE — 80048 BASIC METABOLIC PNL TOTAL CA: CPT

## 2017-12-30 PROCEDURE — 97535 SELF CARE MNGMENT TRAINING: CPT

## 2017-12-30 PROCEDURE — 97116 GAIT TRAINING THERAPY: CPT

## 2017-12-30 PROCEDURE — C1889: CPT

## 2017-12-30 PROCEDURE — 88311 DECALCIFY TISSUE: CPT

## 2017-12-30 PROCEDURE — C1713: CPT

## 2017-12-30 RX ADMIN — Medication 100 MICROGRAM(S): at 05:31

## 2017-12-30 RX ADMIN — APIXABAN 2.5 MILLIGRAM(S): 2.5 TABLET, FILM COATED ORAL at 08:43

## 2017-12-30 RX ADMIN — PANTOPRAZOLE SODIUM 40 MILLIGRAM(S): 20 TABLET, DELAYED RELEASE ORAL at 05:31

## 2017-12-30 RX ADMIN — Medication 1000 MILLIGRAM(S): at 05:31

## 2017-12-30 RX ADMIN — TRAMADOL HYDROCHLORIDE 50 MILLIGRAM(S): 50 TABLET ORAL at 08:43

## 2017-12-30 RX ADMIN — LIOTHYRONINE SODIUM 25 MICROGRAM(S): 25 TABLET ORAL at 05:31

## 2017-12-30 RX ADMIN — TRAMADOL HYDROCHLORIDE 50 MILLIGRAM(S): 50 TABLET ORAL at 08:56

## 2017-12-30 RX ADMIN — Medication 1000 MILLIGRAM(S): at 05:37

## 2017-12-30 RX ADMIN — Medication 25 MILLIGRAM(S): at 05:31

## 2017-12-30 NOTE — PROGRESS NOTE ADULT - PROBLEM SELECTOR PLAN 2
Problem: GERD (gastroesophageal reflux disease).  Recommendation: protonix.

## 2017-12-30 NOTE — PROGRESS NOTE ADULT - PROBLEM SELECTOR PLAN 1
DVT ppx: [ ]ASA81 [ ] [id number] [ ] Lovenox [ ] Coumadin   [ x] Eliquis [  ] Heparin  pain meds -tramadol.  PT/OT.  discharge plan home vs rehab.
DVT ppx: [ ]ASA81 [ ] [id number] [ ] Lovenox [ ] Coumadin   [ x] Eliquis [  ] Heparin  pain meds -tramadol.  PT/OT.  discharge plan home vs rehab.
DVT ppx: [ ]ASA81 [ ] [id number] [ ] Lovenox [ ] Coumadin   [ x] Eliquis [  ] Heparin  pain meds -tramadol.  PT/OT.    Dispo:     Home [ x]     Acute Rehab [ ]     DANIELLE [ ]     TBD [].

## 2017-12-30 NOTE — PROGRESS NOTE ADULT - PROBLEM SELECTOR PLAN 5
monitor cbc. Pt is asymptomatics.

## 2017-12-30 NOTE — PROGRESS NOTE ADULT - SUBJECTIVE AND OBJECTIVE BOX
Discharge medication calendar:  Eliquis 2.5mg q12h x 12 days, then ASA EC 162mg q12h x 4 weeks  APAP 1000mg q8h x 2-3 weeks  Meloxicam 15mg QAM x 2-3 weeks  Omeprazole 40mg QAM (home medication)  Narcotic PRN  Docusate 100mg TID while taking narcotic  Senna, bisacodyl, or Miralax PRN for treatment of constipation
FERMIN BELA  [id number]    Pt is a 62y year old Female s/p left TKR. pain is 4/10. (+) Voids, tolerating regular diet. Denies chest pain/shortness of breath/nausea/vomitting.  Patient complaining of knee buckling yesterday when trying to ambulate    Vital Signs Last 24 Hrs  T(C): 36.8 (29 Dec 2017 07:38), Max: 37.1 (28 Dec 2017 19:22)  T(F): 98.3 (29 Dec 2017 07:38), Max: 98.8 (28 Dec 2017 19:22)  HR: 77 (29 Dec 2017 07:38) (74 - 82)  BP: 117/68 (29 Dec 2017 07:38) (100/57 - 120/76)  BP(mean): --  RR: 18 (29 Dec 2017 07:38) (17 - 20)  SpO2: 92% (29 Dec 2017 07:38) (92% - 98%)      12-28 @ 07:01  -  12-29 @ 07:00  --------------------------------------------------------  IN: 0 mL / OUT: 1070 mL / NET: -1070 mL                              10.3   8.5   )-----------( 174      ( 29 Dec 2017 07:10 )             32.6     12-28    141  |  106  |  9   ----------------------------<  104<H>  4.0   |  28  |  0.70    Ca    8.4      28 Dec 2017 07:15          PE:   LLE: Dressing changed, incision clean and dry, no erythema or draininage, sutures intact SILT distally, (+2) DP/PT pulses, EHL/FHL/TA intact, Capillary refill < 2 seconds. negative calf tenderness PAS on      A: 62y year old Female s/p left TKR POD#2    Plan:   -DVT ppx = apixaban 2.5 milliGRAM(s) Oral every 12 hours  -PT/OT = OOB  -Pain control   -Medicine to follow   -continue to follow Labs  -continue use of PAS  -knee buckling- PE normal, will re-evaluate during therapy today.  Most likely due to block prior to surgery  -Dispo: Changed to subacute rehab due to poor progression
Ortho PA - Post Op Check - S/P Left TKR with adductor canal block and general anesthesia           Pt alert and comfortable with no complaints, pain controlled  Denies nausea     Vital Signs Last 24 Hrs  T(C): 36.3 (12-27-17 @ 15:01), Max: 36.3 (12-27-17 @ 13:18)  T(F): 97.4 (12-27-17 @ 15:01), Max: 97.4 (12-27-17 @ 13:18)  HR: 87 (12-27-17 @ 15:01) (80 - 87)  BP: 114/68 (12-27-17 @ 15:01) (99/79 - 125/69)  BP(mean): --  RR: 18 (12-27-17 @ 15:01) (18 - 20)  SpO2: 98% (12-27-17 @ 15:01) (98% - 100%)  I&O's Detail    27 Dec 2017 07:01  -  27 Dec 2017 16:24  --------------------------------------------------------  IN:    lactated ringers.: 1700 mL  Total IN: 1700 mL    OUT:    Drain: 150 mL (left knee)    Estimated Blood Loss: 200 mL    Voided: 600 mL  Total OUT: 950 mL    Total NET: 750 mL        I&O's Summary    27 Dec 2017 07:01  -  27 Dec 2017 16:24  --------------------------------------------------------  IN: 1700 mL / OUT: 950 mL / NET: 750 mL                     MEDICATIONS:acetaminophen  IVPB. 1000 milliGRAM(s) IV Intermittent every 6 hours  aluminum hydroxide/magnesium hydroxide/simethicone Suspension 30 milliLiter(s) Oral four times a day PRN  amitriptyline 50 milliGRAM(s) Oral at bedtime  ceFAZolin   IVPB 2000 milliGRAM(s) IV Intermittent every 8 hours  docusate sodium 100 milliGRAM(s) Oral three times a day  HYDROmorphone  Injectable 0.5 milliGRAM(s) IV Push every 3 hours PRN  lactated ringers. 1000 milliLiter(s) IV Continuous <Continuous>  magnesium hydroxide Suspension 30 milliLiter(s) Oral daily PRN  meclizine 25 milliGRAM(s) Oral two times a day  ondansetron Injectable 4 milliGRAM(s) IV Push every 6 hours PRN  polyethylene glycol 3350 17 Gram(s) Oral daily PRN  senna 2 Tablet(s) Oral at bedtime  traMADol 50 milliGRAM(s) Oral every 4 hours PRN  traMADol 100 milliGRAM(s) Oral every 6 hours PRN    Anticoagulation: PAS to LE's      Antibiotics:   ceFAZolin   IVPB 2000 milliGRAM(s) IV Intermittent every 8 hours for 2 more doses post-op      Pain medications:   acetaminophen  IVPB. 1000 milliGRAM(s) IV Intermittent every 6 hours  amitriptyline 50 milliGRAM(s) Oral at bedtime  HYDROmorphone  Injectable 0.5 milliGRAM(s) IV Push every 3 hours PRN  meclizine 25 milliGRAM(s) Oral two times a day  ondansetron Injectable 4 milliGRAM(s) IV Push every 6 hours PRN  traMADol 50 milliGRAM(s) Oral every 4 hours PRN  traMADol 100 milliGRAM(s) Oral every 6 hours PRN          PE:  Left knee-primary surgical bandage dry and intact.  Hemovac drain intact and patent.  Feet mobile and sensate.  DP pulse 2+  PAS on LE's.  Calves soft and nontender.    A/P: Ortho stable post-op  - Continue post-op orders; pain management with above plan  - Check labs today and in A.M.  - DVT prevention with Eliquis to start tomorrow; PAS on LE's when in bed  - PT /OT for OOB, full WBAT; brace to prevent knee buckling ordered until leg strength returns for nerve block.  - Medical consult with Hospitalist.  -Discharge planning for Home in 2 days.  -Will continue to monitor closely with attendings.
POD#: 3  S/P: Left  TKR                       SUBJECTIVE: Patient seen and examined.  Feeling well.  Ready to go home.  Reported Pain Score = 3    OBJECTIVE:     Vital Signs Last 24 Hrs  T(C): 36.8 (30 Dec 2017 07:14), Max: 37.1 (29 Dec 2017 19:00)  T(F): 98.3 (30 Dec 2017 07:14), Max: 98.7 (29 Dec 2017 19:00)  HR: 74 (30 Dec 2017 07:14) (71 - 80)  BP: 141/73 (30 Dec 2017 07:14) (100/57 - 141/73)  RR: 17 (30 Dec 2017 07:14) (16 - 18)  SpO2: 95% (30 Dec 2017 07:14) (93% - 96%)    Left Knee:          Incision clean/dry/intact; sutures in place.   Bilateral LEs:         Sensation:  intact to light touch          Motor exam:  5/5 dorsiflexion/plantarflexion/EHL          2+ DP pulses          calf supple, NT    LABS:                        10.0   7.8   )-----------( 162      ( 30 Dec 2017 06:23 )             30.1     12-30    141  |  109<H>  |  10  ----------------------------<  99  3.9   |  27  |  0.64    Ca    8.2<L>      30 Dec 2017 06:23        MEDICATIONS:  Anticoagulation:  apixaban 2.5 milliGRAM(s) Oral every 12 hours      Pain medications:   acetaminophen   Tablet. 1000 milliGRAM(s) Oral every 8 hours  amitriptyline 50 milliGRAM(s) Oral at bedtime  HYDROmorphone  Injectable 0.5 milliGRAM(s) IV Push every 3 hours PRN  meloxicam 15 milliGRAM(s) Oral daily  traMADol 50 milliGRAM(s) Oral every 4 hours PRN  traMADol 100 milliGRAM(s) Oral every 6 hours PRN    A/P: s/p Left  TKR   POD # 3  -    dressing changed  -    Pain control  -    DVT ppx: Eliquis  -    Weight bearing status: WBAT   -    Physical Therapy  -    Occupational Therapy  -    Discharge plan: home today
Patient is a 62y old  Female who presents with a chief complaint of " I have pain in my left knee." (28 Dec 2017 10:24)      INTERVAL HPI/OVERNIGHT EVENTS:  Pt is seen and examined.  feels ok.   Pain is controlled.      Pain Location & Control:     MEDICATIONS  (STANDING):  acetaminophen   Tablet. 1000 milliGRAM(s) Oral every 8 hours  amitriptyline 50 milliGRAM(s) Oral at bedtime  apixaban 2.5 milliGRAM(s) Oral every 12 hours  docusate sodium 100 milliGRAM(s) Oral three times a day  hyoscyamine ER 0.375 milliGRAM(s) Oral daily  lactated ringers. 1000 milliLiter(s) (100 mL/Hr) IV Continuous <Continuous>  levothyroxine 100 MICROGram(s) Oral daily  liothyronine 25 MICROGram(s) Oral daily  meclizine 25 milliGRAM(s) Oral two times a day  meloxicam 15 milliGRAM(s) Oral daily  pantoprazole    Tablet 40 milliGRAM(s) Oral before breakfast  senna 2 Tablet(s) Oral at bedtime    MEDICATIONS  (PRN):  aluminum hydroxide/magnesium hydroxide/simethicone Suspension 30 milliLiter(s) Oral four times a day PRN Indigestion  HYDROmorphone  Injectable 0.5 milliGRAM(s) IV Push every 3 hours PRN Severe Pain (7 - 10)  magnesium hydroxide Suspension 30 milliLiter(s) Oral daily PRN Constipation  ondansetron Injectable 4 milliGRAM(s) IV Push every 6 hours PRN Nausea and/or Vomiting  polyethylene glycol 3350 17 Gram(s) Oral daily PRN Constipation  traMADol 50 milliGRAM(s) Oral every 4 hours PRN Mild Pain (1 - 3)  traMADol 100 milliGRAM(s) Oral every 6 hours PRN Moderate Pain (4 - 6)      Allergies    No Known Allergies    Intolerances    Bactrim (Diarrhea)          Vital Signs Last 24 Hrs  T(C): 36.7 (28 Dec 2017 07:15), Max: 36.8 (27 Dec 2017 23:10)  T(F): 98.1 (28 Dec 2017 07:15), Max: 98.2 (27 Dec 2017 23:10)  HR: 65 (28 Dec 2017 07:15) (65 - 87)  BP: 120/76 (28 Dec 2017 07:15) (99/79 - 125/69)  BP(mean): --  RR: 20 (28 Dec 2017 07:15) (17 - 20)  SpO2: 95% (28 Dec 2017 07:15) (95% - 100%)        LABS:                        10.4   9.4   )-----------( 166      ( 28 Dec 2017 07:15 )             31.6     28 Dec 2017 07:15    141    |  106    |  9      ----------------------------<  104    4.0     |  28     |  0.70     Ca    8.4        28 Dec 2017 07:15          RADIOLOGY & ADDITIONAL TESTS:    Imaging Personally Reviewed:  [ ] YES  [ ] NO    Consultant(s) Notes Reviewed:  [x ] YES  [ ] NO    Care Discussed with Consultants/Other Providers [ x] YES  [ ] NO
Post-Anesthetic Evaluation:    The Patient was interviewed and evaluated    Vital Signs Last 24 Hrs  T(C): 36.7 (28 Dec 2017 11:00), Max: 36.8 (27 Dec 2017 23:10)  T(F): 98.1 (28 Dec 2017 11:00), Max: 98.2 (27 Dec 2017 23:10)  HR: 80 (28 Dec 2017 11:00) (65 - 87)  BP: 100/57 (28 Dec 2017 11:00) (100/57 - 125/69)  BP(mean): --  RR: 20 (28 Dec 2017 11:00) (17 - 20)  SpO2: 98% (28 Dec 2017 11:00) (95% - 100%)    Evaluation:      (X) No apparent complications or complaints regarding anesthesia care at this time  (X) All questions were answered    Condition:  (X) Stable      ( ) Guarded      ( ) Critical    Recommendations:  (X) None     ( ) Other:
SUBJECTIVE: Patient seen and examined. No nausea/vomiting, nor shortness of breath    OBJECTIVE:     Vital Signs Last 24 Hrs  T(C): 36.7 (28 Dec 2017 07:15), Max: 36.8 (27 Dec 2017 23:10)  T(F): 98.1 (28 Dec 2017 07:15), Max: 98.2 (27 Dec 2017 23:10)  HR: 65 (28 Dec 2017 07:15) (65 - 87)  BP: 120/76 (28 Dec 2017 07:15) (99/79 - 125/69)  BP(mean): --  RR: 20 (28 Dec 2017 07:15) (17 - 20)  SpO2: 95% (28 Dec 2017 07:15) (95% - 100%)    PAIN SCORE:   4      SCALE USED: (1-10 VNRS)        Affected extremity:          Dressing: clean/dry/intact post operative dressing with hemovac drainage noted         Sensation:  intact to touch equally to bilateral feet with decreased sensation to non operative right thigh.                  Left thigh is supple, non tense         Motor exam:          5/ 5 Tibialis Anterior/Gastrocnemius-Soleus , EHL         warm well perfused; capillary refill <3 seconds     LABS:                        10.4   9.4   )-----------( 166      ( 28 Dec 2017 07:15 )             31.6     12-28    141  |  106  |  9   ----------------------------<  104<H>  4.0   |  28  |  0.70    Ca    8.4      28 Dec 2017 07:15        CAPILLARY BLOOD GLUCOSE          MEDICATIONS:    Anticoagulation:  apixaban 2.5 milliGRAM(s) Oral every 12 hours      Antibiotics:       Pain medications:   acetaminophen   Tablet. 1000 milliGRAM(s) Oral every 8 hours  amitriptyline 50 milliGRAM(s) Oral at bedtime  HYDROmorphone  Injectable 0.5 milliGRAM(s) IV Push every 3 hours PRN  meclizine 25 milliGRAM(s) Oral two times a day  ondansetron Injectable 4 milliGRAM(s) IV Push every 6 hours PRN  traMADol 50 milliGRAM(s) Oral every 4 hours PRN  traMADol 100 milliGRAM(s) Oral every 6 hours PRN      A/P :  s/p  Left TKR   POD # 1  -    Pain control  -    DVT ppx: Eliquis   -    Noted AM labs  -    Weight bearing status: WBAT   -    Physical Therapy  -    Dispo: Home
Patient is a 62y old  Female who presents with a chief complaint of " I have pain in my left knee." (28 Dec 2017 10:24)      INTERVAL HPI/OVERNIGHT EVENTS: pr feels better and stable for discharge.     Pain Location & Control: controlled.     MEDICATIONS  (STANDING):  acetaminophen   Tablet. 1000 milliGRAM(s) Oral every 8 hours  amitriptyline 50 milliGRAM(s) Oral at bedtime  apixaban 2.5 milliGRAM(s) Oral every 12 hours  docusate sodium 100 milliGRAM(s) Oral three times a day  hyoscyamine ER 0.375 milliGRAM(s) Oral daily  lactated ringers. 1000 milliLiter(s) (100 mL/Hr) IV Continuous <Continuous>  levothyroxine 100 MICROGram(s) Oral daily  liothyronine 25 MICROGram(s) Oral daily  meclizine 25 milliGRAM(s) Oral two times a day  meloxicam 15 milliGRAM(s) Oral daily  pantoprazole    Tablet 40 milliGRAM(s) Oral before breakfast  senna 2 Tablet(s) Oral at bedtime    MEDICATIONS  (PRN):  aluminum hydroxide/magnesium hydroxide/simethicone Suspension 30 milliLiter(s) Oral four times a day PRN Indigestion  bisacodyl Suppository 10 milliGRAM(s) Rectal daily PRN If no bowel movement by postoperative day #2  HYDROmorphone  Injectable 0.5 milliGRAM(s) IV Push every 3 hours PRN Severe Pain (7 - 10)  magnesium hydroxide Suspension 30 milliLiter(s) Oral daily PRN Constipation  ondansetron Injectable 4 milliGRAM(s) IV Push every 6 hours PRN Nausea and/or Vomiting  polyethylene glycol 3350 17 Gram(s) Oral daily PRN Constipation  traMADol 50 milliGRAM(s) Oral every 4 hours PRN Mild Pain (1 - 3)  traMADol 100 milliGRAM(s) Oral every 6 hours PRN Moderate Pain (4 - 6)      Allergies    sulfa drugs (Unknown)    Intolerances    Bactrim (Diarrhea)      REVIEW OF SYSTEMS:  CONSTITUTIONAL: No fever, weight loss, or fatigue  EYES: No eye pain, visual disturbances, or discharge  ENMT:  No difficulty hearing, tinnitus, vertigo; No sinus or throat pain  NECK: No pain or stiffness  BREASTS: No pain, masses, or nipple discharge  RESPIRATORY: No cough, wheezing, chills or hemoptysis; No shortness of breath  CARDIOVASCULAR: No chest pain, palpitations, dizziness, or leg swelling  GASTROINTESTINAL: No abdominal or epigastric pain. No nausea, vomiting, or hematemesis; No diarrhea or constipation. No melena or hematochezia.  GENITOURINARY: No dysuria, frequency, hematuria, or incontinence  NEUROLOGICAL: No headaches, memory loss, loss of strength, numbness, or tremors  SKIN: No itching, burning, rashes, or lesions   LYMPH NODES: No enlarged glands  ENDOCRINE: No heat or cold intolerance; No hair loss; No polydipsia or polyuria  MUSCULOSKELETAL: No back pain  PSYCHIATRIC: No depression, anxiety, mood swings, or difficulty sleeping  HEME/LYMPH: No easy bruising, or bleeding gums  ALLERGY AND IMMUNOLOGIC: No hives or eczema    Vital Signs Last 24 Hrs  T(C): 36.8 (30 Dec 2017 07:14), Max: 37.1 (29 Dec 2017 19:00)  T(F): 98.3 (30 Dec 2017 07:14), Max: 98.7 (29 Dec 2017 19:00)  HR: 74 (30 Dec 2017 07:14) (71 - 74)  BP: 141/73 (30 Dec 2017 07:14) (100/57 - 141/73)  BP(mean): --  RR: 17 (30 Dec 2017 07:14) (16 - 18)  SpO2: 95% (30 Dec 2017 07:14) (93% - 95%)    PHYSICAL EXAM:  GENERAL: NAD, well-groomed, well-developed  HEAD:  Atraumatic, Normocephalic  EYES: EOMI, PERRLA, conjunctiva and sclera clear  ENMT: No tonsillar erythema, exudates, or enlargement; Moist mucous membranes, Good dentition, No lesions  NECK: Supple, No JVD, Normal thyroid  NERVOUS SYSTEM:  Alert & Oriented X3, Good concentration; Motor Strength 5/5 B/L upper and lower extremities; DTRs 2+ intact and symmetric  CHEST/LUNG: Clear to auscultation bilaterally; No rales, rhonchi, wheezing, or rubs  HEART: Regular rate and rhythm; No murmurs, rubs, or gallops  ABDOMEN: Soft, Nontender, Nondistended; Bowel sounds present  EXTREMITIES:  2+ Peripheral Pulses, No clubbing or cyanosis  LYMPH: No lymphadenopathy noted  SKIN: No rashes or lesions  INCISION:  Dressing dry and intact    LABS:                        10.0   7.8   )-----------( 162      ( 30 Dec 2017 06:23 )             30.1     30 Dec 2017 06:23    141    |  109    |  10     ----------------------------<  99     3.9     |  27     |  0.64     Ca    8.2        30 Dec 2017 06:23          CAPILLARY BLOOD GLUCOSE          RADIOLOGY & ADDITIONAL TESTS:    Imaging Personally Reviewed:  [ ] YES  [ ] NO    Consultant(s) Notes Reviewed:  [x ] YES  [ ] NO    Care Discussed with Consultants/Other Providers [x ] YES  [ ] NO
Patient is a 62y old  Female who presents with a chief complaint of " I have pain in my left knee." (28 Dec 2017 10:24)      INTERVAL HPI/OVERNIGHT EVENTS:  Pt is seen and examined.  sitting on chair.  pain is controlled.    Pain Location & Control:     MEDICATIONS  (STANDING):  acetaminophen   Tablet. 1000 milliGRAM(s) Oral every 8 hours  amitriptyline 50 milliGRAM(s) Oral at bedtime  apixaban 2.5 milliGRAM(s) Oral every 12 hours  docusate sodium 100 milliGRAM(s) Oral three times a day  hyoscyamine ER 0.375 milliGRAM(s) Oral daily  lactated ringers. 1000 milliLiter(s) (100 mL/Hr) IV Continuous <Continuous>  levothyroxine 100 MICROGram(s) Oral daily  liothyronine 25 MICROGram(s) Oral daily  meclizine 25 milliGRAM(s) Oral two times a day  meloxicam 15 milliGRAM(s) Oral daily  pantoprazole    Tablet 40 milliGRAM(s) Oral before breakfast  senna 2 Tablet(s) Oral at bedtime    MEDICATIONS  (PRN):  aluminum hydroxide/magnesium hydroxide/simethicone Suspension 30 milliLiter(s) Oral four times a day PRN Indigestion  bisacodyl Suppository 10 milliGRAM(s) Rectal daily PRN If no bowel movement by postoperative day #2  HYDROmorphone  Injectable 0.5 milliGRAM(s) IV Push every 3 hours PRN Severe Pain (7 - 10)  magnesium hydroxide Suspension 30 milliLiter(s) Oral daily PRN Constipation  ondansetron Injectable 4 milliGRAM(s) IV Push every 6 hours PRN Nausea and/or Vomiting  polyethylene glycol 3350 17 Gram(s) Oral daily PRN Constipation  traMADol 50 milliGRAM(s) Oral every 4 hours PRN Mild Pain (1 - 3)  traMADol 100 milliGRAM(s) Oral every 6 hours PRN Moderate Pain (4 - 6)      Allergies    No Known Allergies    Intolerances    Bactrim (Diarrhea)          Vital Signs Last 24 Hrs  T(C): 36.8 (29 Dec 2017 07:38), Max: 37.1 (28 Dec 2017 19:22)  T(F): 98.3 (29 Dec 2017 07:38), Max: 98.8 (28 Dec 2017 19:22)  HR: 77 (29 Dec 2017 07:38) (74 - 82)  BP: 117/68 (29 Dec 2017 07:38) (107/70 - 120/76)  BP(mean): --  RR: 18 (29 Dec 2017 07:38) (17 - 18)  SpO2: 92% (29 Dec 2017 07:38) (92% - 95%)        LABS:                        10.3   8.5   )-----------( 174      ( 29 Dec 2017 07:10 )             32.6     29 Dec 2017 07:10    141    |  107    |  10     ----------------------------<  93     3.6     |  30     |  0.73     Ca    8.2        29 Dec 2017 07:10        RADIOLOGY & ADDITIONAL TESTS:    Imaging Personally Reviewed:  [ ] YES  [ ] NO    Consultant(s) Notes Reviewed:  [x ] YES  [ ] NO    Care Discussed with Consultants/Other Providers [x ] YES  [ ] NO

## 2017-12-30 NOTE — PROGRESS NOTE ADULT - ASSESSMENT
Neurovascular status in tact.  D/C to rehab today if cleared by PT/OT/Med
62 female s/p left TKR on 12/27.

## 2017-12-30 NOTE — PROGRESS NOTE ADULT - PROVIDER SPECIALTY LIST ADULT
Anesthesia
Hospitalist
Orthopedics
Pharmacy
Hospitalist
Hospitalist

## 2017-12-30 NOTE — PROGRESS NOTE ADULT - PROBLEM SELECTOR PROBLEM 1
Aftercare following left knee joint replacement surgery

## 2018-01-10 ENCOUNTER — CHART COPY (OUTPATIENT)
Age: 63
End: 2018-01-10

## 2018-01-11 PROBLEM — Z47.1 AFTERCARE FOLLOWING LEFT KNEE JOINT REPLACEMENT SURGERY: Status: ACTIVE | Noted: 2018-01-11

## 2018-01-12 ENCOUNTER — APPOINTMENT (OUTPATIENT)
Dept: ORTHOPEDIC SURGERY | Facility: CLINIC | Age: 63
End: 2018-01-12
Payer: COMMERCIAL

## 2018-01-12 VITALS
SYSTOLIC BLOOD PRESSURE: 112 MMHG | DIASTOLIC BLOOD PRESSURE: 63 MMHG | BODY MASS INDEX: 40.63 KG/M2 | WEIGHT: 238 LBS | HEART RATE: 83 BPM | HEIGHT: 64 IN

## 2018-01-12 DIAGNOSIS — Z47.1 AFTERCARE FOLLOWING JOINT REPLACEMENT SURGERY: ICD-10-CM

## 2018-01-12 DIAGNOSIS — Z96.652 AFTERCARE FOLLOWING JOINT REPLACEMENT SURGERY: ICD-10-CM

## 2018-01-12 PROCEDURE — 73562 X-RAY EXAM OF KNEE 3: CPT | Mod: LT

## 2018-01-12 PROCEDURE — 99024 POSTOP FOLLOW-UP VISIT: CPT

## 2018-03-02 ENCOUNTER — APPOINTMENT (OUTPATIENT)
Dept: ORTHOPEDIC SURGERY | Facility: CLINIC | Age: 63
End: 2018-03-02
Payer: COMMERCIAL

## 2018-03-02 VITALS
WEIGHT: 238 LBS | HEIGHT: 64 IN | SYSTOLIC BLOOD PRESSURE: 133 MMHG | BODY MASS INDEX: 40.63 KG/M2 | HEART RATE: 76 BPM | DIASTOLIC BLOOD PRESSURE: 78 MMHG

## 2018-03-02 PROCEDURE — 99024 POSTOP FOLLOW-UP VISIT: CPT

## 2018-03-02 PROCEDURE — 73562 X-RAY EXAM OF KNEE 3: CPT | Mod: LT

## 2018-04-05 ENCOUNTER — CHART COPY (OUTPATIENT)
Age: 63
End: 2018-04-05

## 2018-07-16 PROBLEM — I10 ESSENTIAL (PRIMARY) HYPERTENSION: Chronic | Status: INACTIVE | Noted: 2017-01-31 | Resolved: 2017-02-17

## 2018-12-28 ENCOUNTER — APPOINTMENT (OUTPATIENT)
Dept: ORTHOPEDIC SURGERY | Facility: CLINIC | Age: 63
End: 2018-12-28
Payer: COMMERCIAL

## 2018-12-28 VITALS
SYSTOLIC BLOOD PRESSURE: 150 MMHG | DIASTOLIC BLOOD PRESSURE: 87 MMHG | HEART RATE: 78 BPM | HEIGHT: 64 IN | BODY MASS INDEX: 40.63 KG/M2 | WEIGHT: 238 LBS

## 2018-12-28 PROCEDURE — 73562 X-RAY EXAM OF KNEE 3: CPT | Mod: LT

## 2018-12-28 PROCEDURE — 99213 OFFICE O/P EST LOW 20 MIN: CPT

## 2019-02-05 PROBLEM — H81.10 BENIGN PAROXYSMAL VERTIGO, UNSPECIFIED EAR: Chronic | Status: ACTIVE | Noted: 2017-01-31

## 2019-02-05 PROBLEM — M25.562 PAIN IN LEFT KNEE: Chronic | Status: ACTIVE | Noted: 2017-12-12

## 2019-02-05 PROBLEM — K58.9 IRRITABLE BOWEL SYNDROME WITHOUT DIARRHEA: Chronic | Status: ACTIVE | Noted: 2017-01-31

## 2019-02-05 PROBLEM — M19.90 UNSPECIFIED OSTEOARTHRITIS, UNSPECIFIED SITE: Chronic | Status: ACTIVE | Noted: 2017-01-31

## 2019-02-05 PROBLEM — Z97.0 PRESENCE OF ARTIFICIAL EYE: Chronic | Status: ACTIVE | Noted: 2017-01-31

## 2019-02-05 PROBLEM — E03.9 HYPOTHYROIDISM, UNSPECIFIED: Chronic | Status: ACTIVE | Noted: 2017-01-31

## 2019-02-05 PROBLEM — K21.9 GASTRO-ESOPHAGEAL REFLUX DISEASE WITHOUT ESOPHAGITIS: Chronic | Status: ACTIVE | Noted: 2017-01-31

## 2019-02-05 PROBLEM — D72.820 LYMPHOCYTOSIS (SYMPTOMATIC): Chronic | Status: ACTIVE | Noted: 2017-12-12

## 2019-03-07 ENCOUNTER — APPOINTMENT (OUTPATIENT)
Dept: ORTHOPEDIC SURGERY | Facility: CLINIC | Age: 64
End: 2019-03-07
Payer: COMMERCIAL

## 2019-03-07 VITALS
HEART RATE: 80 BPM | DIASTOLIC BLOOD PRESSURE: 82 MMHG | SYSTOLIC BLOOD PRESSURE: 143 MMHG | BODY MASS INDEX: 40.63 KG/M2 | WEIGHT: 238 LBS | HEIGHT: 64 IN

## 2019-03-07 DIAGNOSIS — Z96.651 PRESENCE OF RIGHT ARTIFICIAL KNEE JOINT: ICD-10-CM

## 2019-03-07 DIAGNOSIS — M70.61 TROCHANTERIC BURSITIS, RIGHT HIP: ICD-10-CM

## 2019-03-07 DIAGNOSIS — Z96.652 PRESENCE OF LEFT ARTIFICIAL KNEE JOINT: ICD-10-CM

## 2019-03-07 DIAGNOSIS — M62.81 MUSCLE WEAKNESS (GENERALIZED): ICD-10-CM

## 2019-03-07 PROCEDURE — 99214 OFFICE O/P EST MOD 30 MIN: CPT

## 2019-03-07 PROCEDURE — 73562 X-RAY EXAM OF KNEE 3: CPT | Mod: RT

## 2019-03-07 PROCEDURE — 73502 X-RAY EXAM HIP UNI 2-3 VIEWS: CPT

## 2019-03-09 PROBLEM — Z96.651 STATUS POST TOTAL RIGHT KNEE REPLACEMENT: Status: ACTIVE | Noted: 2017-03-01

## 2019-03-09 PROBLEM — Z96.652 STATUS POST TOTAL LEFT KNEE REPLACEMENT: Status: ACTIVE | Noted: 2018-01-10

## 2019-03-09 PROBLEM — M62.81 MUSCLE WEAKNESS OF LOWER EXTREMITY: Status: ACTIVE | Noted: 2019-03-09

## 2019-12-04 ENCOUNTER — TRANSCRIPTION ENCOUNTER (OUTPATIENT)
Age: 64
End: 2019-12-04

## 2020-08-21 NOTE — ASU PREOP CHECKLIST - ADDITIONAL CONSENTS
[FreeTextEntry1] : 68 yo female with a history of htn, hld, dm presents for evaluation of painful varicose veins in the foot\par \par venous duplex shows ablated / stripped gsv and ssv, reflux noted in the right pop and small varicose vein over the dorsum of the foot.  no insufficency of the left lower extremity \par \par recommend compression stockings and elevation \par  rep

## 2020-11-20 ENCOUNTER — APPOINTMENT (OUTPATIENT)
Dept: PULMONOLOGY | Facility: CLINIC | Age: 65
End: 2020-11-20
Payer: MEDICARE

## 2020-11-20 VITALS
DIASTOLIC BLOOD PRESSURE: 78 MMHG | OXYGEN SATURATION: 98 % | SYSTOLIC BLOOD PRESSURE: 142 MMHG | TEMPERATURE: 98.1 F | HEART RATE: 81 BPM

## 2020-11-20 PROCEDURE — 99204 OFFICE O/P NEW MOD 45 MIN: CPT

## 2020-11-20 RX ORDER — ACETAMINOPHEN 500 MG/1
500 TABLET ORAL
Qty: 90 | Refills: 0 | Status: DISCONTINUED | COMMUNITY
Start: 2017-02-21 | End: 2020-11-20

## 2020-11-20 RX ORDER — TRAMADOL HYDROCHLORIDE 100 MG/1
100 TABLET, EXTENDED RELEASE ORAL
Qty: 90 | Refills: 0 | Status: DISCONTINUED | COMMUNITY
Start: 2017-02-28 | End: 2020-11-20

## 2020-11-20 RX ORDER — TRAMADOL HYDROCHLORIDE 100 MG/1
100 TABLET, EXTENDED RELEASE ORAL
Qty: 90 | Refills: 0 | Status: DISCONTINUED | COMMUNITY
Start: 2017-03-01 | End: 2020-11-20

## 2020-11-20 RX ORDER — LIOTHYRONINE SODIUM 50 UG/1
TABLET ORAL
Refills: 0 | Status: DISCONTINUED | COMMUNITY
End: 2020-11-20

## 2020-11-20 RX ORDER — TRAMADOL HYDROCHLORIDE 50 MG/1
50 TABLET, COATED ORAL
Qty: 60 | Refills: 0 | Status: DISCONTINUED | COMMUNITY
Start: 2017-03-15 | End: 2020-11-20

## 2020-11-20 NOTE — HISTORY OF PRESENT ILLNESS
[Never] : never [TextBox_4] : The patient is a 64yo female with increasing resp sx  since january She has been to a cardiologist and was not in heart failure. She has had several episodes of sciatica She had prednisone tapering doses and improved but the sx returned  she had cxr of her chest She presented a video of her breathing with crackles and wheeze She has hyothyroid with a need for increasing doses  She has " white matter disease" and vertigo on Mri of her brain  She never had asthma or PNA   She had no prior infection in January, but had just returned from a trip to [place]

## 2020-11-20 NOTE — REVIEW OF SYSTEMS
[Fatigue] : fatigue [Chest Tightness] : chest tightness [Dyspnea] : dyspnea [SOB on Exertion] : sob on exertion [GERD] : gerd [Thyroid Problem] : thyroid problem [Negative] : Psychiatric [TextBox_94] : Hip pain and sciatica

## 2020-11-20 NOTE — ASSESSMENT
[FreeTextEntry1] : The patient has an ongoing resp condition with MARTINES associated with Sx at rest that include inspiratory and expiratory wheeze that was recorded by the patient when she was sleeping.The patient also has MARTINES but not associated with the crackles and the wheezing   She had a negative cardiac w/u She is morbidly obese and is at high risk for sleep apnea, her  reports observing her having apnea.The patient is reluctant to have a sleep study. Given her response to steroids other causes of respiratory difficulty needs to be ruled out  She will need a Complete PFT and a Ct of the chest  Ahe will be started on Wixela to try to avoid more PO steroids

## 2020-11-20 NOTE — PHYSICAL EXAM
[Normal Appearance] : normal appearance [No Neck Mass] : no neck mass [Normal Rate/Rhythm] : normal rate/rhythm [Normal S1, S2] : normal s1, s2 [No Murmurs] : no murmurs [No Resp Distress] : no resp distress [Clear to Auscultation Bilaterally] : clear to auscultation bilaterally [No Abnormalities] : no abnormalities [Benign] : benign [Normal Gait] : normal gait [No Clubbing] : no clubbing [No Cyanosis] : no cyanosis [Normal Color/ Pigmentation] : normal color/ pigmentation [Oriented x3] : oriented x3 [Normal Affect] : normal affect [TextBox_2] : morbidly obese   [TextBox_99] : limited by hip pain

## 2020-11-20 NOTE — REASON FOR VISIT
[Initial] : an initial visit [TextBox_44] : PT STATES THAT SHE WENT TO [place] IN JANUARY AND WHEN SHE CAME BACK SHE WAS VERY ILL.  PT HAD COVID SWABS AND ANTIBODIES - WHICH ARE NEGATIVE.  PT STATES THAT SHE HAS COUGHING FITS - MARLON WHEN SHE LAYS DOWN AND LOUD WHEEZING. PT INITIALLY DX WITH ASTHMA. PT WAS GIVEN STEROIDS AND IS STILL ON PREDNISONE  - TAPERING DOSE - BUT IS STILL SYMPTOMATIC. PT ALSO ADMITS TO SOB AND TIGHTNESS IN CHEST, BUT DENIES ANY FEVER OR CHILLS. PT HAS STARTED NEBULIZER TREATMENTS.

## 2020-11-25 RX ORDER — FLUTICASONE PROPIONATE AND SALMETEROL 250; 50 UG/1; UG/1
250-50 POWDER RESPIRATORY (INHALATION) TWICE DAILY
Qty: 1 | Refills: 6 | Status: DISCONTINUED | COMMUNITY
Start: 2020-11-20 | End: 2020-11-25

## 2020-12-08 ENCOUNTER — APPOINTMENT (OUTPATIENT)
Dept: PULMONOLOGY | Facility: CLINIC | Age: 65
End: 2020-12-08

## 2020-12-08 ENCOUNTER — APPOINTMENT (OUTPATIENT)
Dept: PULMONOLOGY | Facility: CLINIC | Age: 65
End: 2020-12-08
Payer: MEDICARE

## 2020-12-08 VITALS
SYSTOLIC BLOOD PRESSURE: 142 MMHG | HEART RATE: 80 BPM | TEMPERATURE: 98.6 F | DIASTOLIC BLOOD PRESSURE: 80 MMHG | OXYGEN SATURATION: 95 %

## 2020-12-08 PROCEDURE — 94060 EVALUATION OF WHEEZING: CPT

## 2020-12-08 PROCEDURE — 99214 OFFICE O/P EST MOD 30 MIN: CPT | Mod: 25

## 2020-12-08 PROCEDURE — 94729 DIFFUSING CAPACITY: CPT

## 2020-12-08 PROCEDURE — 94727 GAS DIL/WSHOT DETER LNG VOL: CPT

## 2020-12-08 NOTE — HISTORY OF PRESENT ILLNESS
[Never] : never [TextBox_4] : the patient is 65 year F with SOB and an ABN CT scan  The patient had a f/u Ct scan that I felt was worse than the scan in 2017  The radiologist report did not assess it as a worsening of interstitial disease  The PFT today shows that her lung function is quite preserve She is doing better on the symbicort

## 2020-12-08 NOTE — ASSESSMENT
[FreeTextEntry1] :  the patient had a discussion with me that she may andrew a bronchoscopy  however after further review I do not think it is indicated    I will obtain a ct in 5mos and continue her inhaler  The patient alexandra also try to lose weight

## 2020-12-08 NOTE — REASON FOR VISIT
[Follow-Up] : a follow-up visit [Cough] : cough [Shortness of Breath] : shortness of breath [Wheezing] : wheezing [TextBox_44] : PT IS A 66 YO FEMALE WITH HX OF WHEEZING, SOB AND NON-PRODUCTIVE COUGH.  PT STATES THAT WHEEZING IS BETTER SINCE SHE STARTED THE INHALER GIVEN LAST VISIT - CAN'T REMEMBER NAME. PT ALSO ADMITS TO CHEST TIGHTNESS, BUT DENIES ANY FEVER, CHILLS OR PAIN.  PT HAD PFT TODAY.

## 2021-04-28 ENCOUNTER — APPOINTMENT (OUTPATIENT)
Dept: PULMONOLOGY | Facility: CLINIC | Age: 66
End: 2021-04-28
Payer: MEDICARE

## 2021-04-28 VITALS
OXYGEN SATURATION: 96 % | TEMPERATURE: 97.3 F | HEART RATE: 85 BPM | SYSTOLIC BLOOD PRESSURE: 118 MMHG | DIASTOLIC BLOOD PRESSURE: 72 MMHG

## 2021-04-28 PROCEDURE — 99214 OFFICE O/P EST MOD 30 MIN: CPT

## 2021-04-28 NOTE — REASON FOR VISIT
[Follow-Up] : a follow-up visit [Cough] : cough [Shortness of Breath] : shortness of breath [Wheezing] : wheezing [TextBox_44] : SEVERE FATIGUE

## 2021-04-28 NOTE — ASSESSMENT
[FreeTextEntry1] :  the patient had a discussion with me that she may andrew a bronchoscopy  however after further review I do not think it is indicated    I will obtain a ct in 5mos and continue her inhaler  The patient alexandra also try to lose weight \par \par \par 4/28/21  the patient has persistent sx and has more weak She had a negative COVID in [place] She had a walk test and she has no desaturation and her her leonardo only slightly for 82 t0 100  I will restart the same rx  breztri,saamlpe given  prednisone and zithromax ( as an ABX and a lung antiinflammatory )

## 2021-04-28 NOTE — HISTORY OF PRESENT ILLNESS
[Never] : never [TextBox_4] : the patient is 65 year F with SOB and an ABN CT scan  The patient had a f/u Ct scan that I felt was worse than the scan in 2017  The radiologist report did not assess it as a worsening of interstitial disease  The PFT today shows that her lung function is quite preserve She is doing better on the symbicort \par \par 4/28/21 The patient is not doing well. When she was in [place] she was placed on oral steroids, antibiotics and proventil   She felt better but now is feeling poorly She has had a ct scan that has multiple small abnormalities reported by Dr Peñaloza but overall  the lungs appear normal

## 2021-04-28 NOTE — REVIEW OF SYSTEMS
[Fatigue] : fatigue [Dyspnea] : dyspnea [SOB on Exertion] : sob on exertion [GERD] : gerd [Thyroid Problem] : thyroid problem [Negative] : Psychiatric [Chest Tightness] : no chest tightness [TextBox_94] : Hip pain and sciatica

## 2021-04-28 NOTE — PHYSICAL EXAM
[No Acute Distress] : no acute distress [Normal Appearance] : normal appearance [No Neck Mass] : no neck mass [Normal Rate/Rhythm] : normal rate/rhythm [Normal S1, S2] : normal s1, s2 [No Resp Distress] : no resp distress [Clear to Auscultation Bilaterally] : clear to auscultation bilaterally [No Clubbing] : no clubbing [No Cyanosis] : no cyanosis [Normal Color/ Pigmentation] : normal color/ pigmentation [No Focal Deficits] : no focal deficits [Oriented x3] : oriented x3

## 2021-05-14 ENCOUNTER — APPOINTMENT (OUTPATIENT)
Dept: PULMONOLOGY | Facility: CLINIC | Age: 66
End: 2021-05-14
Payer: MEDICARE

## 2021-05-14 VITALS
HEART RATE: 78 BPM | TEMPERATURE: 97.1 F | OXYGEN SATURATION: 97 % | SYSTOLIC BLOOD PRESSURE: 134 MMHG | DIASTOLIC BLOOD PRESSURE: 68 MMHG

## 2021-05-14 DIAGNOSIS — Z23 ENCOUNTER FOR IMMUNIZATION: ICD-10-CM

## 2021-05-14 PROCEDURE — 99214 OFFICE O/P EST MOD 30 MIN: CPT

## 2021-05-14 NOTE — ASSESSMENT
[FreeTextEntry1] :  the patient had a discussion with me that she may andrew a bronchoscopy  however after further review I do not think it is indicated    I will obtain a ct in 5mos and continue her inhaler  The patient alexandra also try to lose weight \par \par \par 4/28/21  the patient has persistent sx and has more weak She had a negative COVID in [place] She had a walk test and she has no desaturation and her her leonardo only slightly for 82 t0 100  I will restart the same rx  breztri,saamlpe given  prednisone and zithromax ( as an ABX and a lung antiinflammatory ) \par \par 5/14/21 The patient is doing well and is back to her baseline 1) wean prednisone over one  week 2) continue Breztri for now  3) she is going on a trip to [place] in october and she should continue the medication until after the trip

## 2021-05-14 NOTE — HISTORY OF PRESENT ILLNESS
[Never] : never [TextBox_4] : the patient is 65 year F with SOB and an ABN CT scan  The patient had a f/u Ct scan that I felt was worse than the scan in 2017  The radiologist report did not assess it as a worsening of interstitial disease  The PFT today shows that her lung function is quite preserve She is doing better on the symbicort \par \par 4/28/21 The patient is not doing well. When she was in [place] she was placed on oral steroids, antibiotics and proventil   She felt better but now is feeling poorly She has had a ct scan that has multiple small abnormalities reported by Dr Peñaloza but overall  the lungs appear normal \par \par 5/14/21  the patient has found the Breztri is making her Sx much better  She had some trouble with using the inhaler but it is now doing it  the proper way

## 2021-05-14 NOTE — PHYSICAL EXAM
[No Acute Distress] : no acute distress [Normal Appearance] : normal appearance [Normal Rate/Rhythm] : normal rate/rhythm [Normal S1, S2] : normal s1, s2 [No Murmurs] : no murmurs [No Resp Distress] : no resp distress [Clear to Auscultation Bilaterally] : clear to auscultation bilaterally [No Clubbing] : no clubbing [No Cyanosis] : no cyanosis [Oriented x3] : oriented x3

## 2021-05-14 NOTE — REVIEW OF SYSTEMS
[Fatigue] : fatigue [Chest Tightness] : no chest tightness [Dyspnea] : no dyspnea [SOB on Exertion] : no sob on exertion [GERD] : gerd [Thyroid Problem] : thyroid problem [Negative] : Psychiatric [TextBox_30] : now she is at her baseline and is not MARTINES  [TextBox_94] : Hip pain and sciatica

## 2021-09-17 ENCOUNTER — NON-APPOINTMENT (OUTPATIENT)
Age: 66
End: 2021-09-17

## 2021-09-17 ENCOUNTER — APPOINTMENT (OUTPATIENT)
Dept: PULMONOLOGY | Facility: CLINIC | Age: 66
End: 2021-09-17
Payer: MEDICARE

## 2021-09-17 VITALS
DIASTOLIC BLOOD PRESSURE: 81 MMHG | OXYGEN SATURATION: 96 % | SYSTOLIC BLOOD PRESSURE: 137 MMHG | HEART RATE: 84 BPM | TEMPERATURE: 98.8 F

## 2021-09-17 DIAGNOSIS — J98.01 ACUTE BRONCHOSPASM: ICD-10-CM

## 2021-09-17 DIAGNOSIS — E06.3 AUTOIMMUNE THYROIDITIS: ICD-10-CM

## 2021-09-17 DIAGNOSIS — K58.9 IRRITABLE BOWEL SYNDROME W/OUT DIARRHEA: ICD-10-CM

## 2021-09-17 PROCEDURE — 99214 OFFICE O/P EST MOD 30 MIN: CPT

## 2021-09-17 RX ORDER — AZITHROMYCIN 250 MG/1
250 TABLET, FILM COATED ORAL
Qty: 1 | Refills: 0 | Status: DISCONTINUED | COMMUNITY
Start: 2021-04-28 | End: 2021-09-17

## 2021-09-17 RX ORDER — ALBUTEROL SULFATE 90 UG/1
AEROSOL, METERED RESPIRATORY (INHALATION)
Refills: 0 | Status: DISCONTINUED | COMMUNITY
End: 2021-09-17

## 2022-02-04 ENCOUNTER — APPOINTMENT (OUTPATIENT)
Dept: PULMONOLOGY | Facility: CLINIC | Age: 67
End: 2022-02-04
Payer: MEDICARE

## 2022-02-04 VITALS
BODY MASS INDEX: 40.63 KG/M2 | OXYGEN SATURATION: 97 % | SYSTOLIC BLOOD PRESSURE: 116 MMHG | HEIGHT: 64 IN | DIASTOLIC BLOOD PRESSURE: 74 MMHG | WEIGHT: 238 LBS | TEMPERATURE: 97.5 F | HEART RATE: 76 BPM

## 2022-02-04 DIAGNOSIS — J85.3 ABSCESS OF MEDIASTINUM: ICD-10-CM

## 2022-02-04 DIAGNOSIS — R06.00 DYSPNEA, UNSPECIFIED: ICD-10-CM

## 2022-02-04 DIAGNOSIS — E07.9 DISORDER OF THYROID, UNSPECIFIED: ICD-10-CM

## 2022-02-04 DIAGNOSIS — R73.03 PREDIABETES.: ICD-10-CM

## 2022-02-04 DIAGNOSIS — R06.89 DYSPNEA, UNSPECIFIED: ICD-10-CM

## 2022-02-04 PROCEDURE — 99213 OFFICE O/P EST LOW 20 MIN: CPT

## 2022-02-04 RX ORDER — LEVOTHYROXINE SODIUM 125 MCG
TABLET ORAL
Refills: 0 | Status: DISCONTINUED | COMMUNITY
End: 2022-02-04

## 2022-02-04 RX ORDER — AMITRIPTYLINE HYDROCHLORIDE 10 MG/1
10 TABLET, FILM COATED ORAL
Refills: 0 | Status: DISCONTINUED | COMMUNITY
End: 2022-02-04

## 2022-02-04 RX ORDER — BUDESONIDE, GLYCOPYRROLATE, AND FORMOTEROL FUMARATE 160; 9; 4.8 UG/1; UG/1; UG/1
160-9-4.8 AEROSOL, METERED RESPIRATORY (INHALATION) TWICE DAILY
Qty: 6 | Refills: 3 | Status: DISCONTINUED | COMMUNITY
Start: 2021-05-19 | End: 2022-02-04

## 2022-02-04 RX ORDER — PREDNISONE 10 MG/1
10 TABLET ORAL DAILY
Qty: 60 | Refills: 2 | Status: DISCONTINUED | COMMUNITY
Start: 2021-04-28 | End: 2022-02-04

## 2022-02-04 RX ORDER — AMITRIPTYLINE HYDROCHLORIDE 100 MG/1
100 TABLET, FILM COATED ORAL
Refills: 0 | Status: ACTIVE | COMMUNITY

## 2022-02-04 RX ORDER — OMEPRAZOLE 20 MG/1
TABLET, DELAYED RELEASE ORAL
Refills: 0 | Status: DISCONTINUED | COMMUNITY
End: 2022-02-04

## 2022-02-04 RX ORDER — MELOXICAM 15 MG/1
15 TABLET ORAL
Refills: 0 | Status: ACTIVE | COMMUNITY

## 2022-02-04 RX ORDER — ATORVASTATIN CALCIUM 10 MG/1
10 TABLET, FILM COATED ORAL
Refills: 0 | Status: ACTIVE | COMMUNITY

## 2022-02-04 RX ORDER — OMEPRAZOLE 40 MG/1
40 CAPSULE, DELAYED RELEASE ORAL
Refills: 0 | Status: ACTIVE | COMMUNITY

## 2022-02-04 RX ORDER — METFORMIN HYDROCHLORIDE 625 MG/1
TABLET ORAL
Refills: 0 | Status: DISCONTINUED | COMMUNITY
End: 2022-02-04

## 2022-02-04 RX ORDER — ATORVASTATIN CALCIUM 80 MG/1
TABLET, FILM COATED ORAL
Refills: 0 | Status: DISCONTINUED | COMMUNITY
End: 2022-02-04

## 2022-02-04 RX ORDER — MELOXICAM 15 MG/1
TABLET ORAL
Refills: 0 | Status: DISCONTINUED | COMMUNITY
End: 2022-02-04

## 2022-02-04 RX ORDER — FLUTICASONE PROPIONATE AND SALMETEROL 250; 50 UG/1; UG/1
250-50 POWDER RESPIRATORY (INHALATION) TWICE DAILY
Qty: 3 | Refills: 3 | Status: DISCONTINUED | COMMUNITY
Start: 2021-05-24 | End: 2022-02-04

## 2022-02-04 RX ORDER — AMLODIPINE BESYLATE 5 MG/1
TABLET ORAL
Refills: 0 | Status: DISCONTINUED | COMMUNITY
End: 2022-02-04

## 2022-02-04 NOTE — REVIEW OF SYSTEMS
[Fatigue] : fatigue [Nasal Congestion] : nasal congestion [Postnasal Drip] : postnasal drip [Dry Mouth] : dry mouth [Sinus Problems] : sinus problems [Chest Tightness] : no chest tightness [Dyspnea] : no dyspnea [SOB on Exertion] : no sob on exertion [GERD] : gerd [Thyroid Problem] : thyroid problem [Negative] : Psychiatric [TextBox_3] : obese [TextBox_30] : now she is at her baseline and is not MARTINES  [TextBox_94] : Hip pain and sciatica

## 2022-02-04 NOTE — ASSESSMENT
[FreeTextEntry1] :  the patient had a discussion with me that she may andrew a bronchoscopy  however after further review I do not think it is indicated    I will obtain a ct in 5mos and continue her inhaler  The patient alexandra also try to lose weight \par \par \par 4/28/21  the patient has persistent sx and has more weak She had a negative COVID in [place] She had a walk test and she has no desaturation and her her leonardo only slightly for 82 t0 100  I will restart the same rx  breztri,saamlpe given  prednisone and zithromax ( as an ABX and a lung antiinflammatory ) \par \par 5/14/21 The patient is doing well and is back to her baseline 1) wean prednisone over one  week 2) continue Breztri for now  3) she is going on a trip to [place] in october and she should continue the medication until after the trip \par \par 9/17/21 the patient is doing well the patient is not having rep sx  The will continue the Breztri for until 2 weeks before the next visit\par \par 2/4/2022 1) the patient has an 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  3) patient will have the CAT scan in April and in the seen in May 25 minutes patient was seen counseling evaluation of her problems review of medications

## 2022-02-04 NOTE — HISTORY OF PRESENT ILLNESS
[Never] : never [TextBox_4] : the patient is 65 year F with SOB and an ABN CT scan  The patient had a f/u Ct scan that I felt was worse than the scan in 2017  The radiologist report did not assess it as a worsening of interstitial disease  The PFT today shows that her lung function is quite preserved. She is doing better on the symbicort \par \par 4/28/21 The patient is not doing well. When she was in [place] she was placed on oral steroids, antibiotics and proventil   She felt better but now is feeling poorly She has had a ct scan that has multiple small abnormalities reported by Dr Peñaloza but overall  the lungs appear normal \par \par 5/14/21  the patient has found the Breztri is making her Sx much better  She had some trouble with using the inhaler but it is now doing it  the proper way    \par \par 9/17/21 The patient is doing So So and has seen a neurologist: nerve conduction studies, Endocrinologist: metformin for insulin resistance  and a Podiatrist for her painful feet and swelling: told to get new Balance Shoes   \par \par 2/4/2022 patient returns feeling that her breathing is stable.  She is very happy she has her 11th grandchild born recently.  She feels that the Breo has helped her she stopped it for the last 2 weeks to see if that makes any difference and indeed her cough is worse.

## 2022-02-04 NOTE — REASON FOR VISIT
[Follow-Up] : a follow-up visit [Asthma] : asthma [Cough] : cough [Shortness of Breath] : shortness of breath [Wheezing] : wheezing [TextBox_44] : 4 months. Pt has stopped using Breo 2 weeks prior to office visit as instructed. Since then pt has been coughing more, experiecning SOB on exertion and wheezing.

## 2022-03-02 ENCOUNTER — NON-APPOINTMENT (OUTPATIENT)
Age: 67
End: 2022-03-02

## 2022-05-06 ENCOUNTER — APPOINTMENT (OUTPATIENT)
Dept: PULMONOLOGY | Facility: CLINIC | Age: 67
End: 2022-05-06
Payer: MEDICARE

## 2022-05-06 VITALS
OXYGEN SATURATION: 96 % | BODY MASS INDEX: 42.51 KG/M2 | TEMPERATURE: 97 F | DIASTOLIC BLOOD PRESSURE: 80 MMHG | SYSTOLIC BLOOD PRESSURE: 128 MMHG | HEART RATE: 80 BPM | HEIGHT: 64 IN | WEIGHT: 249 LBS

## 2022-05-06 PROCEDURE — 99214 OFFICE O/P EST MOD 30 MIN: CPT

## 2022-05-13 DIAGNOSIS — R59.0 LOCALIZED ENLARGED LYMPH NODES: ICD-10-CM

## 2022-05-13 NOTE — HISTORY OF PRESENT ILLNESS
[Never] : never [TextBox_4] : the patient is 65 year F with SOB and an ABN CT scan  The patient had a f/u Ct scan that I felt was worse than the scan in 2017  The radiologist report did not assess it as a worsening of interstitial disease  The PFT today shows that her lung function is quite preserved. She is doing better on the symbicort \par \par 4/28/21 The patient is not doing well. When she was in [place] she was placed on oral steroids, antibiotics and proventil   She felt better but now is feeling poorly She has had a ct scan that has multiple small abnormalities reported by Dr Peñaloza but overall  the lungs appear normal \par \par 5/14/21  the patient has found the Breztri is making her Sx much better  She had some trouble with using the inhaler but it is now doing it  the proper way    \par \par 9/17/21 The patient is doing So So and has seen a neurologist: nerve conduction studies, Endocrinologist: metformin for insulin resistance  and a Podiatrist for her painful feet and swelling: told to get new Balance Shoes   \par \par 2/4/2022 patient returns feeling that her breathing is stable.  She is very happy she has her 11th grandchild born recently.  She feels that the Breo has helped her she stopped it for the last 2 weeks to see if that makes any difference and indeed her cough is worse.\par \par \par 5/6/221) weight loss of 31 lbs with dieting  2) lymph node in left axilla  enlarging slowly   3) still has MARTINES  and

## 2022-05-13 NOTE — ASSESSMENT
[FreeTextEntry1] : 5/6/22 1) review CT scan at [facility]  for possible BX  2) Further hx was obtained and the patient had a COVID vaccination on 4/1/22 on 4/1/2022 3 days before the CT scan  3) I was going to obtain a PET/ct but with this new information I will obtain a f/u ct in 8/2022   time spent was 30 minutes review imaging, f/u hx counsellng, education. f/u PEand HX

## 2022-05-13 NOTE — PHYSICAL EXAM
[No Acute Distress] : no acute distress [Normal Appearance] : normal appearance [Normal Rate/Rhythm] : normal rate/rhythm [Normal S1, S2] : normal s1, s2 [No Murmurs] : no murmurs [No Resp Distress] : no resp distress [Clear to Auscultation Bilaterally] : clear to auscultation bilaterally [No Clubbing] : no clubbing [No Cyanosis] : no cyanosis [Oriented x3] : oriented x3 [TextBox_2] : BmI 41

## 2022-05-13 NOTE — REASON FOR VISIT
[Follow-Up] : a follow-up visit [Asthma] : asthma [Cough] : cough [Shortness of Breath] : shortness of breath [TextBox_44] : 3 months, CT 4/4/22
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
## 2022-07-08 ENCOUNTER — APPOINTMENT (OUTPATIENT)
Dept: PULMONOLOGY | Facility: CLINIC | Age: 67
End: 2022-07-08

## 2022-07-08 VITALS
OXYGEN SATURATION: 98 % | BODY MASS INDEX: 42.51 KG/M2 | HEART RATE: 82 BPM | SYSTOLIC BLOOD PRESSURE: 132 MMHG | TEMPERATURE: 97.3 F | HEIGHT: 64 IN | DIASTOLIC BLOOD PRESSURE: 78 MMHG | WEIGHT: 249 LBS

## 2022-07-08 PROCEDURE — 99214 OFFICE O/P EST MOD 30 MIN: CPT

## 2022-07-08 RX ORDER — NEOMYCIN SULFATE, POLYMYXIN B SULFATE AND DEXAMETHASONE 3.5; 10000; 1 MG/ML; [USP'U]/ML; MG/ML
3.5-10000-0.1 SUSPENSION OPHTHALMIC
Qty: 5 | Refills: 0 | Status: ACTIVE | COMMUNITY
Start: 2022-01-28

## 2022-07-08 RX ORDER — SCOPOLAMINE 1.5 MG/1
1 PATCH, EXTENDED RELEASE TRANSDERMAL
Qty: 4 | Refills: 0 | Status: ACTIVE | COMMUNITY
Start: 2022-05-06

## 2022-07-08 RX ORDER — LISINOPRIL 5 MG/1
5 TABLET ORAL
Qty: 90 | Refills: 0 | Status: ACTIVE | COMMUNITY
Start: 2022-03-07

## 2022-07-08 RX ORDER — METFORMIN HYDROCHLORIDE 500 MG/1
500 TABLET, COATED ORAL
Refills: 0 | Status: DISCONTINUED | COMMUNITY
End: 2022-07-08

## 2022-07-08 RX ORDER — LEVOTHYROXINE SODIUM 0.17 MG/1
175 TABLET ORAL
Refills: 0 | Status: DISCONTINUED | COMMUNITY
End: 2022-07-08

## 2022-07-29 NOTE — REVIEW OF SYSTEMS
[Fatigue] : fatigue [Nasal Congestion] : nasal congestion [Postnasal Drip] : postnasal drip [Dry Mouth] : dry mouth [Sinus Problems] : sinus problems [GERD] : gerd [Thyroid Problem] : thyroid problem [Negative] : Psychiatric [Chest Tightness] : no chest tightness [Dyspnea] : no dyspnea [SOB on Exertion] : no sob on exertion [TextBox_3] : obese [TextBox_30] : now she is at her baseline and is not MARTINES  [TextBox_94] : Hip pain and sciatica

## 2022-07-29 NOTE — HISTORY OF PRESENT ILLNESS
[Never] : never [TextBox_4] : the patient is 65 year F with SOB and an ABN CT scan  The patient had a f/u Ct scan that I felt was worse than the scan in 2017  The radiologist report did not assess it as a worsening of interstitial disease  The PFT today shows that her lung function is quite preserved. She is doing better on the symbicort \par \par 4/28/21 The patient is not doing well. When she was in [place] she was placed on oral steroids, antibiotics and proventil   She felt better but now is feeling poorly She has had a ct scan that has multiple small abnormalities reported by Dr Peñaloza but overall  the lungs appear normal \par \par 5/14/21  the patient has found the Breztri is making her Sx much better  She had some trouble with using the inhaler but it is now doing it  the proper way    \par \par 9/17/21 The patient is doing So So and has seen a neurologist: nerve conduction studies, Endocrinologist: metformin for insulin resistance  and a Podiatrist for her painful feet and swelling: told to get new Balance Shoes   \par \par 2/4/2022 patient returns feeling that her breathing is stable.  She is very happy she has her 11th grandchild born recently.  She feels that the Breo has helped her she stopped it for the last 2 weeks to see if that makes any difference and indeed her cough is worse.\par \par \par 5/6/221) weight loss of 31 lbs with dieting  2) lymph node in left axilla  enlarging slowly   3) still has MARTINES  and \par \par \par 7/8/22 1) has back pain and to see Dr RICHI Corado 2) F/u PET scan neg and the lungs ground glass better

## 2022-07-29 NOTE — ASSESSMENT
[FreeTextEntry1] : 5/6/22 1) review CT scan at [facility]  for possible BX  2) Further hx was obtained and the patient had a COVID vaccination on 4/1/22 on 4/1/2022 3 days before the CT scan  3) I was going to obtain a PET/ct but with this new information I will obtain a f/u ct in 8/2022   time spent was 30 minutes review imaging, f/u hx counsellng, education. f/u PEand HX  \par \par 7/8/22 The patient has  multiple problems 1) obesity she continues to try and lose weight I discussed how important weight loss is to her general well being  2) asthma and cough controlled with inhalers Breo has been the main controller  Albuterol PRn  3) abn ct scan ground glass areas and axillary lymph adenopathy  PET scan is neg and axillary nodes smaller   axillary adenopathy was from COVID vaccination   30 minutes spent with counselling, education, review of imaging with the patient   F/u 6moneths consider repeat Ct scan

## 2022-07-29 NOTE — REASON FOR VISIT
[Follow-Up] : a follow-up visit [Asthma] : asthma [Cough] : cough [TextBox_44] : 2 months. Pt complains of a dry cough, denies any chest pain, SOB or wheezing.

## 2022-08-19 ENCOUNTER — APPOINTMENT (OUTPATIENT)
Dept: PULMONOLOGY | Facility: CLINIC | Age: 67
End: 2022-08-19

## 2022-08-19 VITALS
TEMPERATURE: 96.8 F | WEIGHT: 247 LBS | HEART RATE: 85 BPM | OXYGEN SATURATION: 98 % | SYSTOLIC BLOOD PRESSURE: 140 MMHG | DIASTOLIC BLOOD PRESSURE: 78 MMHG | BODY MASS INDEX: 42.17 KG/M2 | HEIGHT: 64 IN

## 2022-08-19 DIAGNOSIS — R59.0 LOCALIZED ENLARGED LYMPH NODES: ICD-10-CM

## 2022-08-19 DIAGNOSIS — J45.41 MODERATE PERSISTENT ASTHMA WITH (ACUTE) EXACERBATION: ICD-10-CM

## 2022-08-19 PROCEDURE — 99214 OFFICE O/P EST MOD 30 MIN: CPT

## 2022-08-19 RX ORDER — ALBUTEROL SULFATE 2.5 MG/3ML
(2.5 MG/3ML) SOLUTION RESPIRATORY (INHALATION)
Refills: 0 | Status: DISCONTINUED | COMMUNITY
End: 2022-08-19

## 2022-08-19 RX ORDER — ASPIRIN/ACETAMINOPHEN/CAFFEINE 500-325-65
325 POWDER IN PACKET (EA) ORAL
Qty: 30 | Refills: 1 | Status: DISCONTINUED | COMMUNITY
Start: 2017-02-28 | End: 2022-08-19

## 2022-08-19 RX ORDER — ALBUTEROL SULFATE 90 UG/1
108 (90 BASE) AEROSOL, METERED RESPIRATORY (INHALATION)
Refills: 0 | Status: DISCONTINUED | COMMUNITY
End: 2022-08-19

## 2022-08-19 RX ORDER — FAMOTIDINE 40 MG/1
40 TABLET, FILM COATED ORAL DAILY
Refills: 0 | Status: ACTIVE | COMMUNITY

## 2022-08-19 RX ORDER — CHROMIUM 200 MCG
TABLET ORAL
Refills: 0 | Status: DISCONTINUED | COMMUNITY
End: 2022-08-19

## 2022-08-19 RX ORDER — AMLODIPINE BESYLATE 5 MG/1
5 TABLET ORAL
Refills: 0 | Status: DISCONTINUED | COMMUNITY
End: 2022-08-19

## 2022-08-19 RX ORDER — BIOTIN 10 MG
TABLET ORAL
Refills: 0 | Status: DISCONTINUED | COMMUNITY
End: 2022-08-19

## 2022-08-19 RX ORDER — CHOLECALCIFEROL (VITAMIN D3) 50 MCG
50 MCG TABLET ORAL
Refills: 0 | Status: DISCONTINUED | COMMUNITY
Start: 2021-05-14 | End: 2022-08-19

## 2022-08-19 NOTE — HISTORY OF PRESENT ILLNESS
[Never] : never [TextBox_4] : the patient is 65 year F with SOB and an ABN CT scan  The patient had a f/u Ct scan that I felt was worse than the scan in 2017  The radiologist report did not assess it as a worsening of interstitial disease  The PFT today shows that her lung function is quite preserved. She is doing better on the symbicort \par \par 4/28/21 The patient is not doing well. When she was in [place] she was placed on oral steroids, antibiotics and proventil   She felt better but now is feeling poorly She has had a ct scan that has multiple small abnormalities reported by Dr Peñaloza but overall  the lungs appear normal \par \par 5/14/21  the patient has found the Breztri is making her Sx much better  She had some trouble with using the inhaler but it is now doing it  the proper way    \par \par 9/17/21 The patient is doing So So and has seen a neurologist: nerve conduction studies, Endocrinologist: metformin for insulin resistance  and a Podiatrist for her painful feet and swelling: told to get new Balance Shoes   \par \par 2/4/2022 patient returns feeling that her breathing is stable.  She is very happy she has her 11th grandchild born recently.  She feels that the Breo has helped her she stopped it for the last 2 weeks to see if that makes any difference and indeed her cough is worse.\par \par \par 5/6/221) weight loss of 31 lbs with dieting  2) lymph node in left axilla  enlarging slowly   3) still has MARTINES  and \par \par \par 7/8/22 1) has back pain and to see Dr RICHI Corado 2) F/u PET scan neg and the lungs ground glass better \par \par 8/19/22The patient's cough is controlled during the daytime  She is still waking up with a cough

## 2022-08-19 NOTE — ASSESSMENT
[FreeTextEntry1] : 5/6/22 1) review CT scan at [facility]  for possible BX  2) Further hx was obtained and the patient had a COVID vaccination on 4/1/22 on 4/1/2022 3 days before the CT scan  3) I was going to obtain a PET/ct but with this new information I will obtain a f/u ct in 8/2022   time spent was 30 minutes review imaging, f/u hx counsellng, education. f/u PEand HX  \par \par 7/8/22 The patient has  multiple problems 1) obesity she continues to try and lose weight I discussed how important weight loss is to her general well being  2) asthma and cough controlled with inhalers Breo has been the main controller  Albuterol PRn  3) abn ct scan ground glass areas and axillary lymph adenopathy  PET scan is neg and axillary nodes smaller   axillary adenopathy was from COVID vaccination   30 minutes spent with counselling, education, review of imaging with the patient   F/u 6moneths consider repeat Ct scan  \par \par 8/19/22 1) The patient's cough is well controlled  during the daytime  I will add Incruse to her regimen for the evening    f/u 3mos   2) f/u ct feb 2023 to f/u the enlarged lymph nodes 3) her other issues in her health and her antritis limit her activity    time spent 30 minutes counseling, education, medication review, new prescription, vies old Ct scan and PFts

## 2022-09-02 ENCOUNTER — NON-APPOINTMENT (OUTPATIENT)
Age: 67
End: 2022-09-02

## 2022-12-02 ENCOUNTER — APPOINTMENT (OUTPATIENT)
Dept: PULMONOLOGY | Facility: CLINIC | Age: 67
End: 2022-12-02

## 2022-12-02 VITALS
RESPIRATION RATE: 18 BRPM | TEMPERATURE: 98.1 F | BODY MASS INDEX: 42.68 KG/M2 | WEIGHT: 250 LBS | DIASTOLIC BLOOD PRESSURE: 68 MMHG | SYSTOLIC BLOOD PRESSURE: 130 MMHG | OXYGEN SATURATION: 97 % | HEIGHT: 64 IN | HEART RATE: 85 BPM

## 2022-12-02 PROCEDURE — 99214 OFFICE O/P EST MOD 30 MIN: CPT

## 2022-12-02 NOTE — HISTORY OF PRESENT ILLNESS
[Never] : never [TextBox_4] : the patient is 65 year F with SOB and an ABN CT scan  The patient had a f/u Ct scan that I felt was worse than the scan in 2017  The radiologist report did not assess it as a worsening of interstitial disease  The PFT today shows that her lung function is quite preserved. She is doing better on the symbicort \par \par 4/28/21 The patient is not doing well. When she was in [place] she was placed on oral steroids, antibiotics and proventil   She felt better but now is feeling poorly She has had a ct scan that has multiple small abnormalities reported by Dr Peñaloza but overall  the lungs appear normal \par \par 5/14/21  the patient has found the Breztri is making her Sx much better  She had some trouble with using the inhaler but it is now doing it  the proper way    \par \par 9/17/21 The patient is doing So So and has seen a neurologist: nerve conduction studies, Endocrinologist: metformin for insulin resistance  and a Podiatrist for her painful feet and swelling: told to get new Balance Shoes   \par \par 2/4/2022 patient returns feeling that her breathing is stable.  She is very happy she has her 11th grandchild born recently.  She feels that the Breo has helped her she stopped it for the last 2 weeks to see if that makes any difference and indeed her cough is worse.\par \par \par 5/6/221) weight loss of 31 lbs with dieting  2) lymph node in left axilla  enlarging slowly   3) still has MARTINES  and \par \par \par 7/8/22 1) has back pain and to see Dr RICHI Corado 2) F/u PET scan neg and the lungs ground glass better \par \par 8/19/22The patient's cough is controlled during the daytime  She is still waking up with a cough  \par \par 12/2/22 The patient  is still has a cough and  has  other medical problems  including  pain in her left groin area

## 2022-12-02 NOTE — REASON FOR VISIT
[Follow-Up] : a follow-up visit [Abnormal CXR/ Chest CT] : an abnormal CXR/ chest CT [Shortness of Breath] : shortness of breath [Wheezing] : wheezing

## 2022-12-02 NOTE — ASSESSMENT
[FreeTextEntry1] : 5/6/22 1) review CT scan at [facility]  for possible BX  2) Further hx was obtained and the patient had a COVID vaccination on 4/1/22 on 4/1/2022 3 days before the CT scan  3) I was going to obtain a PET/ct but with this new information I will obtain a f/u ct in 8/2022   time spent was 30 minutes review imaging, f/u hx counsellng, education. f/u PEand HX  \par \par 7/8/22 The patient has  multiple problems 1) obesity she continues to try and lose weight I discussed how important weight loss is to her general well being  2) asthma and cough controlled with inhalers Breo has been the main controller  Albuterol PRn  3) abn ct scan ground glass areas and axillary lymph adenopathy  PET scan is neg and axillary nodes smaller   axillary adenopathy was from COVID vaccination   30 minutes spent with counselling, education, review of imaging with the patient   F/u 6moneths consider repeat Ct scan  \par \par 8/19/22 1) The patient's cough is well controlled  during the daytime  I will add Incruse to her regimen for the evening    f/u 3mos   2) f/u ct feb 2023 to f/u the enlarged lymph nodes 3) her other issues in her health and her antritis limit her activity    time spent 30 minutes counseling, education, medication review, new prescription, vies old Ct scan and PFts \par \par 12/2/22  I will further increase her inhaled  steroid component for a month

## 2023-01-01 NOTE — OCCUPATIONAL THERAPY INITIAL EVALUATION ADULT - PHYSICAL ASSIST/NONPHYSICAL ASSIST:DRESS UPPER BODY, OT EVAL
[facility] Cecily Langley RN      lactated ringers infusion     Valentina Frank RN      phenylephrine (AKI-Naima Stanley     YOB: 2024 Delivery Clinician:  LYNN DAVIS   Time of birth:  Pregnancy complicated by Late prenatal set-up required

## 2023-01-09 ENCOUNTER — APPOINTMENT (OUTPATIENT)
Dept: PULMONOLOGY | Facility: CLINIC | Age: 68
End: 2023-01-09
Payer: MEDICARE

## 2023-01-16 ENCOUNTER — APPOINTMENT (OUTPATIENT)
Dept: PULMONOLOGY | Facility: CLINIC | Age: 68
End: 2023-01-16
Payer: MEDICARE

## 2023-01-16 VITALS
DIASTOLIC BLOOD PRESSURE: 85 MMHG | WEIGHT: 250 LBS | TEMPERATURE: 97.7 F | OXYGEN SATURATION: 97 % | SYSTOLIC BLOOD PRESSURE: 135 MMHG | HEIGHT: 64 IN | BODY MASS INDEX: 42.68 KG/M2 | HEART RATE: 70 BPM

## 2023-01-16 PROCEDURE — 99214 OFFICE O/P EST MOD 30 MIN: CPT

## 2023-01-16 RX ORDER — LEVOTHYROXINE SODIUM 0.14 MG/1
137 TABLET ORAL
Qty: 90 | Refills: 0 | Status: DISCONTINUED | COMMUNITY
Start: 2022-05-16 | End: 2023-01-16

## 2023-01-16 RX ORDER — LEVOTHYROXINE SODIUM 0.15 MG/1
150 TABLET ORAL
Qty: 90 | Refills: 0 | Status: ACTIVE | COMMUNITY
Start: 2023-01-10

## 2023-01-17 NOTE — ASSESSMENT
[FreeTextEntry1] : 5/6/22 1) review CT scan at [facility]  for possible BX  2) Further hx was obtained and the patient had a COVID vaccination on 4/1/22 on 4/1/2022 3 days before the CT scan  3) I was going to obtain a PET/ct but with this new information I will obtain a f/u ct in 8/2022   time spent was 30 minutes review imaging, f/u hx counsellng, education. f/u PEand HX  \par \par 7/8/22 The patient has  multiple problems 1) obesity she continues to try and lose weight I discussed how important weight loss is to her general well being  2) asthma and cough controlled with inhalers Breo has been the main controller  Albuterol PRn  3) abn ct scan ground glass areas and axillary lymph adenopathy  PET scan is neg and axillary nodes smaller   axillary adenopathy was from COVID vaccination   30 minutes spent with counselling, education, review of imaging with the patient   F/u 6moneths consider repeat Ct scan  \par \par 8/19/22 1) The patient's cough is well controlled  during the daytime  I will add Incruse to her regimen for the evening    f/u 3mos   2) f/u ct feb 2023 to f/u the enlarged lymph nodes 3) her other issues in her health and her antritis limit her activity    time spent 30 minutes counseling, education, medication review, new prescription, vies old Ct scan and PFts \par \par 12/2/22  I will further increase her inhaled  steroid component for a month \par \par 1/16/2023 the patient is still coughing.  I reviewed her breathing tests which are normal I reviewed her CAT scan of the chest which has had some minor groundglass areas and report of bronchial wall thickening both indicating some inflammation in the lung.  The patient has no history of lung disease has no asthma the patient does however have a very large hiatal hernia and on very last CAT scan was dilated and filled with material.  Patient does take famotidine for GERD.  I have no good diagnosis of primary lung disease and the inflammation in the lung may be from secondary GERD aspiration because of the hiatal hernia.  I explained this to the patient and her  the patient has gone to Dr. Abhay Salcido For GERD as well as her colonoscopies.  At this time I asked the patient to get Dr. Ele Dhillon's opinion as to the extent of the hiatal hernia and his opinion on whether this could be part and parcel of her cough.  Time spent 45 minutes counseling, education, documentation, imaging reviewed, medication reviewed,, old records reviewed, HX and PE\par

## 2023-01-17 NOTE — HISTORY OF PRESENT ILLNESS
[TextBox_4] : the patient is 65 year F with SOB and an ABN CT scan  The patient had a f/u Ct scan that I felt was worse than the scan in 2017  The radiologist report did not assess it as a worsening of interstitial disease  The PFT today shows that her lung function is quite preserved. She is doing better on the symbicort \par \par 4/28/21 The patient is not doing well. When she was in [place] she was placed on oral steroids, antibiotics and proventil   She felt better but now is feeling poorly She has had a ct scan that has multiple small abnormalities reported by Dr Peñaloza but overall  the lungs appear normal \par \par 5/14/21  the patient has found the Breztri is making her Sx much better  She had some trouble with using the inhaler but it is now doing it  the proper way    \par \par 9/17/21 The patient is doing So So and has seen a neurologist: nerve conduction studies, Endocrinologist: metformin for insulin resistance  and a Podiatrist for her painful feet and swelling: told to get new Balance Shoes   \par \par 2/4/2022 patient returns feeling that her breathing is stable.  She is very happy she has her 11th grandchild born recently.  She feels that the Breo has helped her she stopped it for the last 2 weeks to see if that makes any difference and indeed her cough is worse.\par \par \par 5/6/221) weight loss of 31 lbs with dieting  2) lymph node in left axilla  enlarging slowly   3) still has MARTINES  and \par \par \par 7/8/22 1) has back pain and to see Dr RICHI Corado 2) F/u PET scan neg and the lungs ground glass better \par \par 8/19/22The patient's cough is controlled during the daytime  She is still waking up with a cough  \par \par 12/2/22 The patient  is still has a cough and  has  other medical problems  including  pain in her left groin area    \par \par 1/16/2023 Arlin Marilin Omi returns with persistence of her cough.  Patient states that interventions inhalers have made little difference in her cough which is mainly in the morning and then later in the evening.  The patient has had a CAT scan showing some bronchial wall thickening she had prior groundglass areas in her lung that have resolved.  The patient is here with her .

## 2023-01-17 NOTE — REASON FOR VISIT
Nutrition Problem #1: Inadequate oral intake  Intervention: Food and/or Nutrient Delivery: Continue NPO, Start Tube Feeding  Nutritional Goals: pt will tolerate the bolus enetral feedings w/o N/v/D and weight will remain stabel [Follow-Up] : a follow-up visit [Cough] : cough [Spouse] : spouse [TextBox_44] : 6 weeks. Pt complains of a dry cough.

## 2023-03-30 NOTE — DISCHARGE NOTE ADULT - CARE PROVIDER_API CALL
Tayo Payan), Orthopaedic Surgery; Sports Medicine  [address] [place] [address]  Phone: [phone]  Fax: [phone] H Plasty Text: Given the location of the defect, shape of the defect and the proximity to free margins a H-plasty was deemed most appropriate for repair.  Using a sterile surgical marker, the appropriate advancement arms of the H-plasty were drawn incorporating the defect and placing the expected incisions within the relaxed skin tension lines where possible. The area thus outlined was incised deep to adipose tissue with a #15 scalpel blade. The skin margins were undermined to an appropriate distance in all directions utilizing iris scissors.  The opposing advancement arms were then advanced into place in opposite direction and anchored with interrupted buried subcutaneous sutures.

## 2023-04-03 ENCOUNTER — APPOINTMENT (OUTPATIENT)
Dept: PULMONOLOGY | Facility: CLINIC | Age: 68
End: 2023-04-03
Payer: MEDICARE

## 2023-04-03 VITALS
SYSTOLIC BLOOD PRESSURE: 138 MMHG | HEART RATE: 80 BPM | WEIGHT: 257 LBS | DIASTOLIC BLOOD PRESSURE: 80 MMHG | TEMPERATURE: 94.4 F | OXYGEN SATURATION: 98 % | HEIGHT: 64 IN | BODY MASS INDEX: 43.87 KG/M2

## 2023-04-03 DIAGNOSIS — E66.9 OBESITY, UNSPECIFIED: ICD-10-CM

## 2023-04-03 DIAGNOSIS — Q40.1 CONGENITAL HIATUS HERNIA: ICD-10-CM

## 2023-04-03 DIAGNOSIS — K44.9 DIAPHRAGMATIC HERNIA W/OUT OBSTRUCTION OR GANGRENE: ICD-10-CM

## 2023-04-03 PROBLEM — J98.01 BRONCHOSPASM: Status: ACTIVE | Noted: 2020-11-20

## 2023-04-03 PROCEDURE — 99215 OFFICE O/P EST HI 40 MIN: CPT

## 2023-04-03 RX ORDER — ORAL SEMAGLUTIDE 7 MG/1
7 TABLET ORAL
Refills: 0 | Status: DISCONTINUED | COMMUNITY
End: 2023-04-03

## 2023-04-03 NOTE — HISTORY OF PRESENT ILLNESS
[Never] : never [TextBox_4] : the patient is 65 year F with SOB and an ABN CT scan  The patient had a f/u Ct scan that I felt was worse than the scan in 2017  The radiologist report did not assess it as a worsening of interstitial disease  The PFT today shows that her lung function is quite preserve She is doing better on the symbicort \par \par 4/28/21 The patient is not doing well. When she was in [place] she was placed on oral steroids, antibiotics and proventil   She felt better but now is feeling poorly She has had a ct scan that has multiple small abnormalities reported by Dr Peñaloza but overall  the lungs appear normal \par \par 5/14/21  the patient has found the Breztri is making her Sx much better  She had some trouble with using the inhaler but it is now doing it  the proper way    \par \par 9/17/21 The patient is doing So So and has seen a neurologist: nerve conduction studies, Endocrinologist: metformin for insulin resistance  and a Podiatrist for her painful feet and swelling: told to get new Balance Shoes   \par \par 4/3/ 23 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

## 2023-04-03 NOTE — REASON FOR VISIT
[Follow-Up] : a follow-up visit [TextBox_44] : Hx of wheezing - Pt states that she still has some breakthrough wheezing with cough - sx are better.  Pt also admits to SOB and fatigue when she walks a lot.

## 2023-04-03 NOTE — ASSESSMENT
[FreeTextEntry1] :  the patient had a discussion with me that she may andrew a bronchoscopy  however after further review I do not think it is indicated    I will obtain a ct in 5mos and continue her inhaler  The patient alexandra also try to lose weight \par \par \par 4/28/21  the patient has persistent sx and has more weak She had a negative COVID in [place] She had a walk test and she has no desaturation and her her leonardo only slightly for 82 t0 100  I will restart the same rx  breztri,saamlpe given  prednisone and zithromax ( as an ABX and a lung antiinflammatory ) \par \par 5/14/21 The patient is doing well and is back to her baseline 1) wean prednisone over one  week 2) continue Breztri for now  3) she is going on a trip to [place] in october and she should continue the medication until after the trip \par \par 9/17/21 the patient is doing well the patient is not having rep sx  The will continue the Breztri for until 2 weeks before the next visit \par \par 4/3/23

## 2023-04-06 PROBLEM — K44.9 HIATAL HERNIA: Status: ACTIVE | Noted: 2023-04-06

## 2023-04-06 PROBLEM — Q40.1 HERNIA, HIATAL, CONGENITAL: Status: ACTIVE | Noted: 2023-04-06

## 2023-04-06 PROBLEM — E66.9 OBESITY (BMI 30-39.9): Status: ACTIVE | Noted: 2022-12-02

## 2023-04-06 NOTE — PHYSICAL EXAM
[No Acute Distress] : no acute distress [Normal Appearance] : normal appearance [Normal Rate/Rhythm] : normal rate/rhythm [Normal S1, S2] : normal s1, s2 [No Murmurs] : no murmurs [No Resp Distress] : no resp distress [Clear to Auscultation Bilaterally] : clear to auscultation bilaterally [No Clubbing] : no clubbing [No Cyanosis] : no cyanosis [Oriented x3] : oriented x3 [TextBox_2] : BmI 42--now -44

## 2023-04-06 NOTE — HISTORY OF PRESENT ILLNESS
[TextBox_4] : the patient is 65 year F with SOB and an ABN CT scan  The patient had a f/u Ct scan that I felt was worse than the scan in 2017  The radiologist report did not assess it as a worsening of interstitial disease  The PFT today shows that her lung function is quite preserve She is doing better on the symbicort \par \par 4/28/21 The patient is not doing well. When she was in [place] she was placed on oral steroids, antibiotics and proventil   She felt better but now is feeling poorly She has had a ct scan that has multiple small abnormalities reported by Dr Peñaloza but overall  the lungs appear normal \par \par 5/14/21  the patient has found the Breztri is making her Sx much better  She had some trouble with using the inhaler but it is now doing it  the proper way    \par \par 9/17/21 The patient is doing So So and has seen a neurologist: nerve conduction studies, Endocrinologist: metformin for insulin resistance  and a Podiatrist for her painful feet and swelling: told to get new Balance Shoes   \par \par 4/3/ 23 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 She had a ct scan on 3/21  with a PE CTA protocol  for SOB  Since that time she has felt better    \par \par

## 2023-04-06 NOTE — REASON FOR VISIT
[Follow-Up] : a follow-up visit [Asthma] : asthma [Cough] : cough [Spouse] : spouse [TextBox_44] : 3 months. Pt had CT chest [facility] 3/21/2023 ordered by PCP due to wheezing that continued after using nebulizer. Pt states she currently has a dry cough mostly at night and in morning. Pt states she feels like she can not take a deep breath. Pt stated she has a colonoscopy and endoscopy scheduled for 4/25/2023. No other pulmonary issues at this time.

## 2023-04-06 NOTE — REVIEW OF SYSTEMS
[Fatigue] : fatigue [Nasal Congestion] : nasal congestion [Postnasal Drip] : postnasal drip [Dry Mouth] : dry mouth [Sinus Problems] : sinus problems [GERD] : gerd [Thyroid Problem] : thyroid problem [Negative] : Psychiatric [Chest Tightness] : no chest tightness [Dyspnea] : no dyspnea [SOB on Exertion] : no sob on exertion [TextBox_3] : obese [TextBox_30] : now she is at her baseline and is not MARTINES  [TextBox_69] : has a large hiatal hernia  [TextBox_94] : Hip pain and sciatica

## 2023-04-06 NOTE — ASSESSMENT
[FreeTextEntry1] :  the patient had a discussion with me that she may andrew a bronchoscopy  however after further review I do not think it is indicated    I will obtain a ct in 5mos and continue her inhaler  The patient alexandra also try to lose weight \par \par \par 4/28/21  the patient has persistent sx and has more weak She had a negative COVID in [place] She had a walk test and she has no desaturation and her her leonardo only slightly for 82 t0 100  I will restart the same rx  breztri,saamlpe given  prednisone and zithromax ( as an ABX and a lung antiinflammatory ) \par \par 5/14/21 The patient is doing well and is back to her baseline 1) wean prednisone over one  week 2) continue Breztri for now  3) she is going on a trip to [place] in october and she should continue the medication until after the trip \par \par 9/17/21 the patient is doing well the patient is not having rep sx  The will continue the Breztri for until 2 weeks before the next visit \par \par 4/3/23  The patient  is generally better, her  agrees.  She had a f/u ct scan on 3/21  She had a small ground glass  nodule in the RUL and RLL    I reviewed the ct scans and those areas were not present  9/26/2022.  She will need a f/u ct scan in 3-4mos  She has a very large hiatal hernia with  fluid   with sx of gerd, She mayn be having silent aspiration    we discussed her need to lose weight   time spent 45min counseling, education, documentation, imaging reviewed, medication reviewed,  old records reviewed, HX and PE

## 2023-06-02 NOTE — H&P PST ADULT - ALLERGIC/IMMUNOLOGIC
Rhombic Flap Text: The defect edges were debeveled with a #15 scalpel blade.  Given the location of the defect and the proximity to free margins a rhombic flap was deemed most appropriate.  Using a sterile surgical marker, an appropriate rhombic flap was drawn incorporating the defect.    The area thus outlined was incised deep to adipose tissue with a #15 scalpel blade.  The skin margins were undermined to an appropriate distance in all directions utilizing iris scissors. negative

## 2023-08-08 ENCOUNTER — APPOINTMENT (OUTPATIENT)
Dept: PULMONOLOGY | Facility: CLINIC | Age: 68
End: 2023-08-08
Payer: MEDICARE

## 2023-08-08 VITALS
TEMPERATURE: 97.1 F | OXYGEN SATURATION: 97 % | SYSTOLIC BLOOD PRESSURE: 120 MMHG | BODY MASS INDEX: 35 KG/M2 | WEIGHT: 205 LBS | DIASTOLIC BLOOD PRESSURE: 78 MMHG | HEART RATE: 81 BPM | HEIGHT: 64 IN

## 2023-08-08 DIAGNOSIS — R05.3 CHRONIC COUGH: ICD-10-CM

## 2023-08-08 PROCEDURE — 99214 OFFICE O/P EST MOD 30 MIN: CPT

## 2023-08-08 RX ORDER — DULOXETINE HYDROCHLORIDE 30 MG/1
30 CAPSULE, DELAYED RELEASE PELLETS ORAL
Qty: 90 | Refills: 0 | Status: DISCONTINUED | COMMUNITY
Start: 2022-06-21 | End: 2023-08-08

## 2023-08-08 NOTE — REASON FOR VISIT
[Follow-Up] : a follow-up visit [Asthma] : asthma [Cough] : cough [TextBox_44] : 4 months.  Pt can not afford both inhalers she states she is only using the Breo as the other one is not helping anyway. Pt still has cough at night. Had her colo/endo done 4/24/2023.

## 2023-08-08 NOTE — ASSESSMENT
[FreeTextEntry1] :  the patient had a discussion with me that she may andrew a bronchoscopy  however after further review I do not think it is indicated    I will obtain a ct in 5mos and continue her inhaler  The patient alexandra also try to lose weight    4/28/21  the patient has persistent sx and has more weak She had a negative COVID in [place] She had a walk test and she has no desaturation and her her leonardo only slightly for 82 t0 100  I will restart the same rx  breztri,saamlpe given  prednisone and zithromax ( as an ABX and a lung antiinflammatory )   5/14/21 The patient is doing well and is back to her baseline 1) wean prednisone over one  week 2) continue Breztri for now  3) she is going on a trip to [place] in october and she should continue the medication until after the trip   9/17/21 the patient is doing well the patient is not having rep sx  The will continue the Breztri for until 2 weeks before the next visit   4/3/23  The patient  is generally better, her  agrees.  She had a f/u ct scan on 3/21  She had a small ground glass  nodule in the RUL and RLL    I reviewed the ct scans and those areas were not present  9/26/2022.  She will need a f/u ct scan in 3-4mos  She has a very large hiatal hernia with  fluid   with sx of gerd, She mayn be having silent aspiration    we discussed her need to lose weight   time spent 45min counseling, education, documentation, imaging reviewed, medication reviewed,  old records reviewed, HX and PE  8/8/2023 1) cough this is associated with the groundglass opacities found in the right lung the patient's cough has improved but has not resolved and indeed she has some crackles in the right base at this time a CAT scan was supposed to be repeated but the requisition date was incorrect I have asked her to proceed with getting the CAT scan done prior to her lumpectomy.  2) the patient has no pulmonary contraindication for the proposed l lumpectomy.  3) patient has no additional pulmonary risk for the proposed lumpectomy under general anesthesia.  4) patient will follow-up after surgery in approximately 3 weeks and after evaluation of the new CAT scan time spent 30 minutes counseling, education, documentation, imaging reviewed, medication reviewed, old records reviewed, HX and PE

## 2023-08-08 NOTE — HISTORY OF PRESENT ILLNESS
[Never] : never [TextBox_4] : 8/8/23   The  had a routine mammogram and  had a mass in the  breast.   She is scheduled for a  lumpectomy and lymph node dissection.  The patient is being followed for cough.  The patient had a CAT scan in March 2023 which revealed some groundglass patchy opacities in the right lung.  She is supposed to have a follow-up CAT scan today but the date to be done was not correct on the requisition.  Patient states her cough is better she has no cough during the day but the cough keeps her up at night.

## 2023-08-08 NOTE — PHYSICAL EXAM
[No Acute Distress] : no acute distress [Normal Appearance] : normal appearance [Normal Rate/Rhythm] : normal rate/rhythm [Normal S1, S2] : normal s1, s2 [No Murmurs] : no murmurs [No Resp Distress] : no resp distress [Clear to Auscultation Bilaterally] : clear to auscultation bilaterally [No Clubbing] : no clubbing [No Cyanosis] : no cyanosis [Oriented x3] : oriented x3 [TextBox_2] : BmI 42--now -44 improved to 35 [TextBox_68] : Crackles are heard in the right base

## 2023-08-08 NOTE — REVIEW OF SYSTEMS
[Fatigue] : fatigue [Nasal Congestion] : nasal congestion [Postnasal Drip] : postnasal drip [Dry Mouth] : dry mouth [Sinus Problems] : sinus problems [Chest Tightness] : no chest tightness [Dyspnea] : no dyspnea [SOB on Exertion] : no sob on exertion [GERD] : gerd [Thyroid Problem] : thyroid problem [Negative] : Psychiatric [TextBox_3] : obese [TextBox_30] : now she is at her baseline and is not MARTINES  [TextBox_69] : has a large hiatal hernia  [TextBox_94] : Hip pain and sciatica

## 2023-08-29 ENCOUNTER — APPOINTMENT (OUTPATIENT)
Dept: PULMONOLOGY | Facility: CLINIC | Age: 68
End: 2023-08-29
Payer: MEDICARE

## 2023-08-29 VITALS
TEMPERATURE: 97.7 F | BODY MASS INDEX: 43.87 KG/M2 | WEIGHT: 257 LBS | DIASTOLIC BLOOD PRESSURE: 78 MMHG | SYSTOLIC BLOOD PRESSURE: 122 MMHG | HEART RATE: 83 BPM | OXYGEN SATURATION: 97 % | HEIGHT: 64 IN

## 2023-08-29 DIAGNOSIS — R91.8 OTHER NONSPECIFIC ABNORMAL FINDING OF LUNG FIELD: ICD-10-CM

## 2023-08-29 PROCEDURE — 99214 OFFICE O/P EST MOD 30 MIN: CPT

## 2023-08-29 RX ORDER — UMECLIDINIUM 62.5 UG/1
62.5 AEROSOL, POWDER ORAL DAILY
Qty: 90 | Refills: 3 | Status: COMPLETED | COMMUNITY
Start: 2022-08-19 | End: 2023-08-29

## 2023-08-29 RX ORDER — EXEMESTANE 25 MG/1
TABLET, FILM COATED ORAL
Refills: 0 | Status: ACTIVE | COMMUNITY

## 2023-08-29 RX ORDER — MECLIZINE HYDROCHLORIDE 25 MG/1
TABLET ORAL
Refills: 0 | Status: ACTIVE | COMMUNITY

## 2023-08-29 RX ORDER — ERGOCALCIFEROL 1.25 MG/1
1.25 MG CAPSULE, LIQUID FILLED ORAL
Qty: 13 | Refills: 0 | Status: DISCONTINUED | COMMUNITY
Start: 2022-03-21 | End: 2023-08-29

## 2023-08-29 RX ORDER — MECLIZINE HCL 25 MG
25 TABLET ORAL
Refills: 0 | Status: DISCONTINUED | COMMUNITY
End: 2023-08-29

## 2023-08-30 PROBLEM — R91.8 ABNORMAL CT SCAN OF LUNG: Status: ACTIVE | Noted: 2020-12-08

## 2023-08-30 PROBLEM — R91.8 GROUND GLASS OPACITY PRESENT ON IMAGING OF LUNG: Status: ACTIVE | Noted: 2023-04-06

## 2023-08-30 NOTE — REASON FOR VISIT
[Follow-Up] : a follow-up visit [Asthma] : asthma [Family Member] : family member [Other: _____] : [unfilled] [TextBox_44] : 3 weeks. Pt has no pulmonary issues at this time.

## 2023-08-30 NOTE — HISTORY OF PRESENT ILLNESS
[Never] : never [TextBox_4] : 8/29/23  !) the patient is doing well and is here after a lumpectomy and axillary dissection.  No positive nodes.  She had a Ct scan on Aug 9th and there was resolution of the ground glass areas.

## 2023-08-30 NOTE — PHYSICAL EXAM
[No Acute Distress] : no acute distress [Normal Appearance] : normal appearance [Normal Rate/Rhythm] : normal rate/rhythm [Normal S1, S2] : normal s1, s2 [No Murmurs] : no murmurs [No Resp Distress] : no resp distress [Clear to Auscultation Bilaterally] : clear to auscultation bilaterally [No Clubbing] : no clubbing [No Cyanosis] : no cyanosis [Oriented x3] : oriented x3 [No Focal Deficits] : no focal deficits [TextBox_2] : BmI 42--now -44 improved to 35 [TextBox_68] : Crackles are heard in the right base

## 2023-08-30 NOTE — ASSESSMENT
[FreeTextEntry1] :  the patient had a discussion with me that she may andrew a bronchoscopy  however after further review I do not think it is indicated    I will obtain a ct in 5mos and continue her inhaler  The patient alexandra also try to lose weight    4/28/21  the patient has persistent sx and has more weak She had a negative COVID in [place] She had a walk test and she has no desaturation and her her leonardo only slightly for 82 t0 100  I will restart the same rx  breztri,saamlpe given  prednisone and zithromax ( as an ABX and a lung antiinflammatory )   5/14/21 The patient is doing well and is back to her baseline 1) wean prednisone over one  week 2) continue Breztri for now  3) she is going on a trip to [place] in october and she should continue the medication until after the trip   9/17/21 the patient is doing well the patient is not having rep sx  The will continue the Breztri for until 2 weeks before the next visit   4/3/23  The patient  is generally better, her  agrees.  She had a f/u ct scan on 3/21  She had a small ground glass  nodule in the RUL and RLL    I reviewed the ct scans and those areas were not present  9/26/2022.  She will need a f/u ct scan in 3-4mos  She has a very large hiatal hernia with  fluid   with sx of gerd, She mayn be having silent aspiration    we discussed her need to lose weight   time spent 45min counseling, education, documentation, imaging reviewed, medication reviewed,  old records reviewed, HX and PE  8/8/2023 1) cough this is associated with the groundglass opacities found in the right lung the patient's cough has improved but has not resolved and indeed she has some crackles in the right base at this time a CAT scan was supposed to be repeated but the requisition date was incorrect I have asked her to proceed with getting the CAT scan done prior to her lumpectomy.  2) the patient has no pulmonary contraindication for the proposed l lumpectomy.  3) patient has no additional pulmonary risk for the proposed lumpectomy under general anesthesia.  4) patient will follow-up after surgery in approximately 3 weeks and after evaluation of the new CAT scan time spent 30 minutes counseling, education, documentation, imaging reviewed, medication reviewed, old records reviewed, HX and PE  8/30/2023 the patient's CAT scan of the chest was stable to improved.  The groundglass opacities have resolved.  These may represent inflammatory disease that may come and go.  The CT was reviewed with the patient.  The patient will need a follow-up CAT scan in 1 year or sooner if she has adverse pulmonary symptoms that develop.  Patient will be seen in follow-up in 6 months time spent 30 minutes  counseling, education, documentation, imaging reviewed, medication reviewed, , old records reviewed, HX and PE

## 2023-09-05 NOTE — PATIENT PROFILE ADULT. - PROVIDER NOTIFICATION
[facility] is calling to request a refill on the following medication(s):    Medication Request:  Requested Prescriptions     Pending Prescriptions Disp Refills    amphetamine-dextroamphetamine (ADDERALL XR) 15 MG extended release capsule [Pharmacy Med Name: Amphetamine-Dextroamphet ER Oral Capsule Extended Release 24 Hour 15 MG] 30 capsule 0     Sig: TAKE 1 CAPSULE BY MOUTH EVERY DAY       Last Visit Date (If Applicable):  2/1/6852    Next Visit Date:    10/23/2023 Yes

## 2024-02-13 ENCOUNTER — APPOINTMENT (OUTPATIENT)
Dept: PULMONOLOGY | Facility: CLINIC | Age: 69
End: 2024-02-13

## 2024-03-05 ENCOUNTER — APPOINTMENT (OUTPATIENT)
Dept: PULMONOLOGY | Facility: CLINIC | Age: 69
End: 2024-03-05
Payer: MEDICARE

## 2024-03-05 VITALS
DIASTOLIC BLOOD PRESSURE: 76 MMHG | OXYGEN SATURATION: 95 % | TEMPERATURE: 98.7 F | HEIGHT: 64 IN | SYSTOLIC BLOOD PRESSURE: 128 MMHG | HEART RATE: 87 BPM | BODY MASS INDEX: 42.68 KG/M2 | WEIGHT: 250 LBS

## 2024-03-05 PROCEDURE — 99214 OFFICE O/P EST MOD 30 MIN: CPT

## 2024-03-31 NOTE — ASSESSMENT
[FreeTextEntry1] : 8/8/2023 1) cough this is associated with the groundglass opacities found in the right lung the patient's cough has improved but has not resolved and indeed she has some crackles in the right base at this time a CAT scan was supposed to be repeated but the requisition date was incorrect I have asked her to proceed with getting the CAT scan done prior to her lumpectomy. 2) the patient has no pulmonary contraindication for the proposed l lumpectomy. 3) patient has no additional pulmonary risk for the proposed lumpectomy under general anesthesia. 4) patient will follow-up after surgery in approximately 3 weeks and after evaluation of the new CAT scan time spent 30 minutes counseling, education, documentation, imaging reviewed, medication reviewed, old records reviewed, HX and PE 8/30/2023 the patient's CAT scan of the chest was stable to improved. The groundglass opacities have resolved. These may represent inflammatory disease that may come and go. The CT was reviewed with the patient. The patient will need a follow-up CAT scan in 1 year or sooner if she has adverse pulmonary symptoms that develop. Patient will be seen in follow-up in 6 months time spent 30 minutes counseling, education, documentation, imaging reviewed, medication reviewed, , old records reviewed, HX and PE. 3/5/24  The patient is stressed about the cost of the medication especially Breo  She was told there is a generic form and that [facility]   has  a low price    She is now stable  after  the events:  hives, GI sx, and bronchitis   of what happened in [place].   She has a supply o f the Breo -3months and will return in  August for a Ct scan  time spent 30 min  counseling, education, documentation, imaging reviewed, medication reviewed, inhaler demonstrated, old records reviewed, HX and PE

## 2024-03-31 NOTE — HISTORY OF PRESENT ILLNESS
[Never] : never [TextBox_4] : 8/29/23 !) the patient is doing well and is here after a lumpectomy and axillary dissection. No positive nodes. She had a Ct scan on Aug 9th and there was resolution of the ground glass areas.   Smoking Status: never   eCigarettes: never    3/5/2024 Arlin Braga is a 68-year-old female who had a lumpectomy for breast cancer.  She has been followed for groundglass opacities. She  had a August 9, 2023 CT and the opacities had  resolved.  She has had CAT scans since November 2020.  She has been on vacation in [place] and had a bronchitis and associated  GI sx  including vomiting  She has been on Breo  which helped but had to pay  $ 700  s has  a ct scan due for  AUG

## 2024-03-31 NOTE — PHYSICAL EXAM
[No Acute Distress] : no acute distress [Normal Appearance] : normal appearance [Normal Rate/Rhythm] : normal rate/rhythm [Normal S1, S2] : normal s1, s2 [No Murmurs] : no murmurs [No Resp Distress] : no resp distress [Clear to Auscultation Bilaterally] : clear to auscultation bilaterally [No Clubbing] : no clubbing [No Cyanosis] : no cyanosis [No Focal Deficits] : no focal deficits [Oriented x3] : oriented x3 [TextBox_2] : BmI 42--now -44 improved to 35 [TextBox_68] : Crackles are heard in the right base

## 2024-03-31 NOTE — REASON FOR VISIT
[Follow-Up] : a follow-up visit [Asthma] : asthma [Shortness of Breath] : shortness of breath [Wheezing] : wheezing [TextEntry] : Pt states that she just recovered from a cold, no pulmonary concerns at this time.

## 2024-05-21 RX ORDER — FLUTICASONE FUROATE AND VILANTEROL TRIFENATATE 200; 25 UG/1; UG/1
200-25 POWDER RESPIRATORY (INHALATION)
Qty: 3 | Refills: 3 | Status: ACTIVE | COMMUNITY
Start: 2021-05-26 | End: 1900-01-01

## 2024-06-11 ENCOUNTER — NON-APPOINTMENT (OUTPATIENT)
Age: 69
End: 2024-06-11

## 2024-07-02 ENCOUNTER — APPOINTMENT (OUTPATIENT)
Dept: PHYSICAL MEDICINE AND REHAB | Facility: CLINIC | Age: 69
End: 2024-07-02

## 2024-08-29 ENCOUNTER — APPOINTMENT (OUTPATIENT)
Dept: INFECTIOUS DISEASE | Facility: CLINIC | Age: 69
End: 2024-08-29
Payer: MEDICARE

## 2024-08-29 VITALS
OXYGEN SATURATION: 100 % | SYSTOLIC BLOOD PRESSURE: 128 MMHG | BODY MASS INDEX: 42.68 KG/M2 | HEIGHT: 64 IN | DIASTOLIC BLOOD PRESSURE: 70 MMHG | HEART RATE: 81 BPM | TEMPERATURE: 98.1 F | WEIGHT: 250 LBS

## 2024-08-29 DIAGNOSIS — I10 ESSENTIAL (PRIMARY) HYPERTENSION: ICD-10-CM

## 2024-08-29 DIAGNOSIS — K58.9 IRRITABLE BOWEL SYNDROME W/OUT DIARRHEA: ICD-10-CM

## 2024-08-29 DIAGNOSIS — E06.3 AUTOIMMUNE THYROIDITIS: ICD-10-CM

## 2024-08-29 DIAGNOSIS — C50.919 MALIGNANT NEOPLASM OF UNSPECIFIED SITE OF UNSPECIFIED FEMALE BREAST: ICD-10-CM

## 2024-08-29 DIAGNOSIS — K21.9 GASTRO-ESOPHAGEAL REFLUX DISEASE W/OUT ESOPHAGITIS: ICD-10-CM

## 2024-08-29 PROCEDURE — 99204 OFFICE O/P NEW MOD 45 MIN: CPT

## 2024-08-29 RX ORDER — CEFADROXIL 500 MG/1
500 CAPSULE ORAL TWICE DAILY
Qty: 28 | Refills: 1 | Status: ACTIVE | COMMUNITY
Start: 2024-08-29 | End: 1900-01-01

## 2024-08-29 NOTE — HISTORY OF PRESENT ILLNESS
[FreeTextEntry1] : 69 Female with hx right breast cancer S//P partial right mastectomy treated with Keflex for recent cellulitis of right breast.  pt developed erythema and tenderness right breast in July, had a Positive BC 7/24 got Group B Strep.  Pt received a few doses of  IVAB and then was treated with 4 weeks Keflex.  Breast cellulitis has mostly resolved , no persistent pain, no open wound or drainage.  No fever  MRI Right Breast  8/26/24:  Post surgical changes Right Breast, no evidence of malignancy or infection  LABS  8/23/34:  WBC  6.2, Hgb  11.5,  PLT  224

## 2024-08-29 NOTE — PHYSICAL EXAM
[General Appearance - Alert] : alert [General Appearance - In No Acute Distress] : in no acute distress [General Appearance - Well Nourished] : well nourished [Outer Ear] : the ears and nose were normal in appearance [Hearing Threshold Finger Rub Not Cannon] : hearing was normal [Examination Of The Oral Cavity] : the lips and gums were normal [Oropharynx] : the oropharynx was normal with no thrush [Neck Appearance] : the appearance of the neck was normal [Neck Cervical Mass (___cm)] : no neck mass was observed [Jugular Venous Distention Increased] : there was no jugular-venous distention [Thyroid Diffuse Enlargement] : the thyroid was not enlarged [Respiration, Rhythm And Depth] : normal respiratory rhythm and effort [Exaggerated Use Of Accessory Muscles For Inspiration] : no accessory muscle use [Auscultation Breath Sounds / Voice Sounds] : lungs were clear to auscultation bilaterally [Heart Rate And Rhythm] : heart rate was normal and rhythm regular [Heart Sounds] : normal S1 and S2 [Heart Sounds Gallop] : no gallops [Murmurs] : no murmurs [Heart Sounds Pericardial Friction Rub] : no pericardial rub [Full Pulse] : the pedal pulses are present [Edema] : there was no peripheral edema [Bowel Sounds] : normal bowel sounds [Abdomen Soft] : soft [Abdomen Tenderness] : non-tender [Abdomen Mass (___ Cm)] : no abdominal mass palpated [Costovertebral Angle Tenderness] : no CVA tenderness [FreeTextEntry1] : Breast Exam, MA in Room<  No erythema nodules normal right breast exam, normal left breast [No Palpable Adenopathy] : no palpable adenopathy [Cervical Lymph Nodes Enlarged Posterior Bilaterally] : posterior cervical [Cervical Lymph Nodes Enlarged Anterior Bilaterally] : anterior cervical [Femoral Lymph Nodes Enlarged Bilaterally] : femoral [Inguinal Lymph Nodes Enlarged Bilaterally] : inguinal [Musculoskeletal - Swelling] : no joint swelling [Range of Motion to Joints] : range of motion to joints [Nail Clubbing] : no clubbing  or cyanosis of the fingernails [Motor Tone] : muscle strength and tone were normal [Skin Color & Pigmentation] : normal skin color and pigmentation [] : no rash [Skin Lesions] : no skin lesions [Cranial Nerves] : cranial nerves 2-12 were intact [Sensation] : the sensory exam was normal to light touch and pinprick [Motor Exam] : the motor exam was normal [No Focal Deficits] : no focal deficits [Oriented To Time, Place, And Person] : oriented to person, place, and time [Affect] : the affect was normal

## 2024-08-29 NOTE — ASSESSMENT
[FreeTextEntry1] : 69 Female with Breast cancer S/P Partial Right Mastectomy., Resolved right breast cellulitis and Group B Strep  Bacteremia   S/P 4 weeks oral Keflex. No indication for surgical drainage.   Breast MRI Normal.  Stool C. diff negative.  REC:  1.  D/C Keflex            2.  Duricef 500 mg PO q12h for 14 days            3.  return 2 weeks            4.  review vaccine history [Treatment Education] : treatment education [Medical Care Issues] : medical care issues

## 2024-08-29 NOTE — REVIEW OF SYSTEMS
[Fever] : no fever [Chills] : no chills [Body Aches] : no body aches [Difficulty Sleeping] : no difficulty sleeping [Feeling Tired] : not feeling tired [Feeling Sick] : not feeling sick [Normal Appetite] : appetite not normal  [Eye Pain] : no eye pain [Eyesight Problems] : eyesight problems [Earache] : no earache [Loss Of Hearing] : no hearing loss [Nasal Discharge] : no nasal discharge [Sore Throat] : no sore throat [Hoarseness] : no hoarseness [Chest Pain] : no chest pain [Palpitations] : no palpitations [Leg Claudication] : no intermittent leg claudication [Lower Ext Edema] : no lower extremity edema [Shortness Of Breath] : no shortness of breath [Wheezing] : no wheezing [Cough] : no cough [SOB on Exertion] : no shortness of breath during exertion [Sputum] : not coughing up ~M sputum [Pleuritic Chest Pain] : no pleuritic chest pain [Abdominal Pain] : no abdominal pain [Vomiting] : no vomiting [Constipation] : no constipation [Diarrhea] : no diarrhea [Dysuria] : no dysuria [Negative] : Heme/Lymph [FreeTextEntry3] : Blind Left eye, left eye prosthesis

## 2024-09-11 ENCOUNTER — APPOINTMENT (OUTPATIENT)
Dept: NEUROLOGY | Facility: CLINIC | Age: 69
End: 2024-09-11

## 2024-09-12 ENCOUNTER — APPOINTMENT (OUTPATIENT)
Dept: INFECTIOUS DISEASE | Facility: CLINIC | Age: 69
End: 2024-09-12
Payer: MEDICARE

## 2024-09-12 VITALS
DIASTOLIC BLOOD PRESSURE: 82 MMHG | SYSTOLIC BLOOD PRESSURE: 136 MMHG | OXYGEN SATURATION: 98 % | RESPIRATION RATE: 15 BRPM | HEART RATE: 83 BPM | TEMPERATURE: 98.3 F

## 2024-09-12 DIAGNOSIS — N61.0 MASTITIS WITHOUT ABSCESS: ICD-10-CM

## 2024-09-12 DIAGNOSIS — R78.81 BACTEREMIA: ICD-10-CM

## 2024-09-12 DIAGNOSIS — C50.919 MALIGNANT NEOPLASM OF UNSPECIFIED SITE OF UNSPECIFIED FEMALE BREAST: ICD-10-CM

## 2024-09-12 DIAGNOSIS — Z87.19 PERSONAL HISTORY OF OTHER DISEASES OF THE DIGESTIVE SYSTEM: ICD-10-CM

## 2024-09-12 DIAGNOSIS — Z23 ENCOUNTER FOR IMMUNIZATION: ICD-10-CM

## 2024-09-12 DIAGNOSIS — Z87.898 PERSONAL HISTORY OF OTHER SPECIFIED CONDITIONS: ICD-10-CM

## 2024-09-12 DIAGNOSIS — J98.01 ACUTE BRONCHOSPASM: ICD-10-CM

## 2024-09-12 DIAGNOSIS — B95.1 BACTEREMIA: ICD-10-CM

## 2024-09-12 PROCEDURE — 99214 OFFICE O/P EST MOD 30 MIN: CPT

## 2024-09-12 NOTE — REASON FOR VISIT
[Follow-Up: _____] : a [unfilled] follow-up visit [FreeTextEntry1] : 2 week fu, Rt breast cellulitis pt completed 14 days Duricef yesterday

## 2024-09-12 NOTE — ASSESSMENT
[FreeTextEntry1] : 69 Female with Breast cancer S/P Partial Right Mastectomy., Resolved right breast cellulitis and Group B Strep Bacteremia S/P 4 weeks oral Keflex and 2 weeks oral Duricef.. No indication for surgical drainage. Breast MRI Normal. Stool C. diff negative  PLAN:  No Additional AB             Pt plans to receive Influenza and Covid vaccines in October

## 2024-09-12 NOTE — PHYSICAL EXAM
[General Appearance - Alert] : alert [General Appearance - In No Acute Distress] : in no acute distress [General Appearance - Well Nourished] : well nourished [General Appearance - Well-Appearing] : healthy appearing [Sclera] : the sclera and conjunctiva were normal [PERRL With Normal Accommodation] : pupils were equal in size, round, reactive to light [Extraocular Movements] : extraocular movements were intact [Outer Ear] : the ears and nose were normal in appearance [Hearing Threshold Finger Rub Not Burt] : hearing was normal [Examination Of The Oral Cavity] : the lips and gums were normal [Oropharynx] : the oropharynx was normal with no thrush [Neck Appearance] : the appearance of the neck was normal [Neck Cervical Mass (___cm)] : no neck mass was observed [Jugular Venous Distention Increased] : there was no jugular-venous distention [Thyroid Diffuse Enlargement] : the thyroid was not enlarged [Respiration, Rhythm And Depth] : normal respiratory rhythm and effort [Exaggerated Use Of Accessory Muscles For Inspiration] : no accessory muscle use [Auscultation Breath Sounds / Voice Sounds] : lungs were clear to auscultation bilaterally [Heart Rate And Rhythm] : heart rate was normal and rhythm regular [Heart Sounds] : normal S1 and S2 [Heart Sounds Gallop] : no gallops [Murmurs] : no murmurs [Heart Sounds Pericardial Friction Rub] : no pericardial rub [Full Pulse] : the pedal pulses are present [Edema] : there was no peripheral edema [Bowel Sounds] : normal bowel sounds [Abdomen Soft] : soft [Abdomen Tenderness] : non-tender [Abdomen Mass (___ Cm)] : no abdominal mass palpated [Costovertebral Angle Tenderness] : no CVA tenderness [No Palpable Adenopathy] : no palpable adenopathy [Cervical Lymph Nodes Enlarged Posterior Bilaterally] : posterior cervical [Cervical Lymph Nodes Enlarged Anterior Bilaterally] : anterior cervical [Musculoskeletal - Swelling] : no joint swelling [Range of Motion to Joints] : range of motion to joints [Nail Clubbing] : no clubbing  or cyanosis of the fingernails [Motor Tone] : muscle strength and tone were normal [Skin Color & Pigmentation] : normal skin color and pigmentation [] : no rash [Skin Lesions] : no skin lesions [FreeTextEntry1] : Left Breast no signs of cellulitis, no erythema, no open wound, no drainage [Cranial Nerves] : cranial nerves 2-12 were intact [Sensation] : the sensory exam was normal to light touch and pinprick [Motor Exam] : the motor exam was normal [No Focal Deficits] : no focal deficits [Oriented To Time, Place, And Person] : oriented to person, place, and time [Affect] : the affect was normal

## 2024-09-12 NOTE — HISTORY OF PRESENT ILLNESS
[FreeTextEntry1] : 69 Female completed course of Duricef.  No C/O, No signs left breast infection. Plans to obtain Influenza and Covid vaccines next month No fever,  erythema, drainage, or tenderness.   LABS 8/23/24:  WBC 6.2,  Hgb  11.5,  ,

## 2024-09-12 NOTE — REVIEW OF SYSTEMS
[Fever] : no fever [Chills] : no chills [Body Aches] : no body aches [Difficulty Sleeping] : no difficulty sleeping [Feeling Tired] : not feeling tired [Feeling Sick] : not feeling sick [Normal Appetite] : appetite not normal  [Eye Pain] : no eye pain [Red Eyes] : eyes not red [Eyesight Problems] : no eyesight problems [Discharge From Eyes] : no purulent discharge from the eyes [Loss Of Hearing] : no hearing loss [Earache] : no earache [Nasal Discharge] : no nasal discharge [Sore Throat] : no sore throat [Hoarseness] : no hoarseness [Chest Pain] : no chest pain [Palpitations] : no palpitations [Leg Claudication] : no intermittent leg claudication [Lower Ext Edema] : no lower extremity edema [Shortness Of Breath] : no shortness of breath [Wheezing] : no wheezing [Cough] : no cough [SOB on Exertion] : no shortness of breath during exertion [Sputum] : not coughing up ~M sputum [Pleuritic Chest Pain] : no pleuritic chest pain [Abdominal Pain] : no abdominal pain [Vomiting] : no vomiting [Constipation] : no constipation [Diarrhea] : no diarrhea [Dysuria] : no dysuria [Negative] : Heme/Lymph

## 2024-09-17 ENCOUNTER — APPOINTMENT (OUTPATIENT)
Dept: PULMONOLOGY | Facility: CLINIC | Age: 69
End: 2024-09-17
Payer: MEDICARE

## 2024-09-17 VITALS
DIASTOLIC BLOOD PRESSURE: 82 MMHG | OXYGEN SATURATION: 97 % | HEIGHT: 64 IN | SYSTOLIC BLOOD PRESSURE: 132 MMHG | WEIGHT: 255 LBS | TEMPERATURE: 96.9 F | BODY MASS INDEX: 43.54 KG/M2 | HEART RATE: 84 BPM
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
## 2024-09-17 DIAGNOSIS — T17.320A FOOD IN LARYNX CAUSING ASPHYXIATION, INITIAL ENCOUNTER: ICD-10-CM

## 2024-09-17 DIAGNOSIS — R91.8 OTHER NONSPECIFIC ABNORMAL FINDING OF LUNG FIELD: ICD-10-CM

## 2024-09-17 DIAGNOSIS — T17.320D: ICD-10-CM

## 2024-09-17 PROCEDURE — 99215 OFFICE O/P EST HI 40 MIN: CPT

## 2024-09-18 ENCOUNTER — APPOINTMENT (OUTPATIENT)
Dept: PULMONOLOGY | Facility: CLINIC | Age: 69
End: 2024-09-18
Payer: MEDICARE

## 2024-09-18 PROBLEM — T17.320D: Status: ACTIVE | Noted: 2024-09-18

## 2024-09-18 PROBLEM — T17.320A CHOKING DUE TO FOOD IN LARYNX, INITIAL ENCOUNTER: Status: ACTIVE | Noted: 2024-09-18

## 2024-09-18 PROCEDURE — 94060 EVALUATION OF WHEEZING: CPT

## 2024-09-18 PROCEDURE — 94729 DIFFUSING CAPACITY: CPT

## 2024-09-18 PROCEDURE — 94727 GAS DIL/WSHOT DETER LNG VOL: CPT

## 2024-09-23 DIAGNOSIS — R13.10 DYSPHAGIA, UNSPECIFIED: ICD-10-CM

## 2024-09-24 RX ORDER — LEVOTHYROXINE SODIUM 0.17 MG/1
175 TABLET ORAL
Refills: 0 | Status: ACTIVE | COMMUNITY

## 2024-09-24 NOTE — REASON FOR VISIT
[Follow-Up] : a follow-up visit [Asthma] : asthma [Cough] : cough [Shortness of Breath] : shortness of breath [Wheezing] : wheezing [TextEntry] : Patient had a CT scan at  last week she is here for the results.  Patient states she has SOB on exertion and when she lays down, she starts to wheeze. She does have a cough and chest tightness when she takes a deep breath.  She is able to inhale but she has difficulty exhaling.  Patient is in a wellness group at the oncology department, they are teaching her how to breath.  EA

## 2024-09-24 NOTE — HISTORY OF PRESENT ILLNESS
[Never] : never [TextBox_4] : 9/1724   increasing degree of dyspnea  and has Reported difficulty with laying dwon and with eating and a feeling of choking  Yvette has exertional dyspnea but is morbidly with q BMI of 43  She feels very frustrated  She is on Breo to maximize her lung function She is sp breast cancer and had surgery and radiation She has post radiation changes on her CT scan and a fluid density in the right breast which is felt to be post surgical change. the patient has a large hiatal hernia

## 2024-09-24 NOTE — ASSESSMENT
[FreeTextEntry1] :  9/17/24 1) The patient is frustrated in her inability to function  2) She has no hx of smoking or asthma and no suggestion of primary lung disease   2) she has a choking feeling when she lays down and has a large hiatal hernia  These can contribute to her feeling od respiratory difficultly  3) I suggested a modified Barium swallow, weight loss, possible GI consult and cardiac consult  She had a PFT c/w mild mechanical restriction of morbid obesity

## 2024-10-29 NOTE — PRE-OP CHECKLIST - CHLOROHEXIDINE WASH IN ASU
Progress Note - Geriatric Medicine   Name: Kirsty Em 71 y.o. female I MRN: [id number]  Unit/Bed#: 2 E 253-01 I Date of Admission: 10/27/2024   Date of Service: 10/29/2024 I Hospital Day: 2    Assessment & Plan  Drug-induced encephalopathy  At a baseline is alert and oriented x  4   On exam patient is alert and oriented x 4  Noted more alert then yesterday  Both  and patient state have forgetfulness, memory loss but is worse in the setting of being very hard of hearing in left ear status post stroke in June  Does not wear hearing aids  Became confused/lethargic 10/26, came to hospital the early morning of 10/27  Seen at urgent care 10/25 for malodorous smelling urine, prescribed Bactrim for UTI  Per  started Friday, 10/25, one dose and received both doses on Saturday, 10/26  TSH 2021: 1.102  Vit B12 1/2024: 593  CT head 10/27/24: mild microangiopathic change  MRI 6/2024: acute infarct along posterior L frontal cortex; Small L frontal brain mass, meningioma; moderate chronic microangiopathic ischemic changes  Identify and treat reversible causes of confusion including infection, dehydration, electrolyte imbalance, anemia, hypoxia, urinary retention, constipation, pain, and sleep disturbance  At risk for delirium due to acute encephalopathy  Recommend delirium precautions  Maintain sleep-wake cycle, avoid nighttime interruptions  Provide adequate pain control  Avoid urinary retention and constipation  Provide frequent and early mobilization  Provide frequent redirection and reorientation as needed  Avoid medications that may worsen or precipitate delirium such as tramadol, benzodiazepines, anticholinergics, and Benadryl  Redirect unwanted behaviors as first-line therapy, avoid physical restraints as able to  Continue to monitor  No formal cognitive screening per chart review, would recommend completing once more at baseline cognitively    Polypharmacy  Currently prescribed PTA: Xanax 2 mg four times  daily, Gabapentin 300 mg TID, Keppra 750 mg Q12H, Seroquel 100 mg Q12H  Patient husbands states in rare instances, patient has severe panic attacks at night and will give extra dose of Xanax (so will get 10 mg in a day; however very rarely occurs)  Doses and medications not recommended in geriatric population due to side effects: confusion, somnolence, increased risk for falls, respiratory failure  All besides Keppra prescribed by Psychologist, Dr. Ward who she sees outpatient ~ every 3 months  Hx of depression, anxiety  Per Epic chart review appears to have been started on Seroquel and Xanax in ~ 2020/2021  Continue to wean off Xanax  Currently during hospitalization: decreased Xanax to 1 mg TID PRN; received one dose this morning at 0900  Continue to monitor for withdrawal symptoms/seizures since appears to have been taking Xanax at this dose and frequency since 2023  Previously prescribed 2 mg four times daily PRN  Primary provider tried to call Psychiatrist and left voicemail to discuss dosing/frequency  Decreased Gabapentin to 100 mg TID scheduled, received all doses  Continuing previous dose of Seroquel and Keppra  Complains of insomnia last night  Has not had withdrawal symptoms since hospitalization, actually less lethargic, more alert  Dose and frequency of Xanax in geriatric patient is not recommended due to side effects of drowsiness, headaches, increased fall risk, confusion, dizziness/lightheadedness, however seizure history noted  Due to seizure history since June 2024, ordered scheduled Xanax 2 mg BID once in morning and at bedtime currently   Will continue to monitor dosing/anxiety/cognition/withdrawal sxs/seizures  Encounter for delirium elderly at risk (DEAR) screening  Patient at risk for delirium secondary to age, cognitive decline, poly pharmacy, hospitalization  Identify and treat underlying cause  Provide frequent redirection, reorientation, distraction techniques  Avoid deliriogenic  medications such as tramadol, benzodiazepines, anticholinergics,  Benadryl  Treat pain, See geriatric pain protocol  Monitor for constipation and urinary retention  Encourage early and frequent moblization, OOB  Encourage Hydration/ Nutrition  Implement sleep hygiene, limit night time interuptions, group activities  Avoid physical restraints  Use chemical restraint only when other efforts have failed, recommend zyprexa 2.5mg IM q8h prn  Monitor Qtc, if greater than 500 do not use antipsychotic medication  If QTc greater than 460, monitor and replete and deficiency of  K and Mg, recheck EKG  EKG QTc: 451  Frailty syndrome in geriatric patient  Clinical Frail Scale: 5- Mildly Frail  More evident slowing, needs help high order IADLs (transport, bills, medications)  Progressively impairs shopping and walking outside alone, meal prep and housework   Lives at home with   Has support from family; spouse and daughters  No longer drives d/t seizure history  [place] (hard of hearing)  Patient does have hearing impairment  Reported hard of hearing bilaterally, worse on L side s/p stroke  Does not wear hearing aids  Referral to Audiology previously made by PCP in August  Would recommend scheduling once discharged  Hearing impairment  correlated with depression, cognitive impairment, delirium and falls in the older adult  Speak clearly  Use sound amplifier  Speak face to face  Use clear dictation and enunciation of words  Anxiety disorder  Follows outpatient with Dr. Ward, psychiatry  Prescribed Xanax 2 mg four times daily, however as noted above, will take extra dose at night for panic attacks per , but very rare occurrence   Continue to follow up outpatient with Dr. Ward  Tobacco dependence  Continue to smoke daily  Nicotine patch currently ordered  Educate about cessation  Acute respiratory failure with hypoxia (HCC)  Upon arrival to ED, O2 saturation in mid 80s, requiring 2 L NC; lethargic  CT chest/abd/pelvis  showing: mild atelectasis, correlation for superimposed aspiration given small amount of debris/secretion within dependent trachea  Aspiration precautions  Speech therapy consulted  Continue to wean off O2 as able to, with goal > 90%  History of stroke  Previously admitted in June 2024 with new onset Afib and L posterior  frontal cortex acute to subacute infarct  Continue ASA, Eliquis and Keppra  Seizure disorder (HCC)  Status post stroke in June as listed above  No longer drives  Continue Keppra  Seizure precautions  Brain mass  Meningioma hx  Previously saw Neurosurgery 7/17/24, will have follow up in 6 months to repeat MRI  Acute cystitis without hematuria  As listed above in drug induced encephalopathy  UA 10/2, 10/25 positive for Leukocytes and Nitrites  Bactrim discontinued, give Ceftriaxone x 4 doses  Currently denies urinary symptoms at time of exam  Ambulatory dysfunction  At a baseline ambulates with cane but only when going outside of the house  Has had previous falls within the last 6 months, seen at hospital in June  Currently using RW during hospitalization  PT/OT following  Fall precautions  Out of bed as tolerated  Encourage early and frequent mobilization  Encourage adequate hydration and nutrition  Provide adequate pain management   Goal is to STR  Continue with PT/OT for continued strength and balance training     Devin Lofton is a 71 year old female being seen today for follow up with Geriatrics. Upon exam, patient is sitting at the edge of bed ready to go for another walk. Has ambulated with assistance of RW and  walks besides three times already today through the halls. Steady getting up, denies dizziness/lightheadedness. She denies CP, SOB, n/v/d/c, states she had trouble sleeping last night and was feeling anxious, but did not receive Xanax PRN per chart review. She has no complaints at this time.     Objective :  Temp:  [98.7 °F (37.1 °C)-99.2 °F (37.3 °C)] 99.2 °F (37.3  °C)  HR:  [79-87] 84  BP: (108-140)/(61-81) 111/75  Resp:  [16-19] 19  SpO2:  [92 %-94 %] 94 %  O2 Device: Nasal cannula  Nasal Cannula O2 Flow Rate (L/min):  [1 L/min] 1 L/min    Physical Exam  Vitals and nursing note reviewed.   Constitutional:       Appearance: Normal appearance. She is normal weight.   HENT:      Head: Normocephalic.      Mouth/Throat:      Mouth: Mucous membranes are moist.      Pharynx: Oropharynx is clear.   Eyes:      Conjunctiva/sclera: Conjunctivae normal.   Cardiovascular:      Rate and Rhythm: Normal rate and regular rhythm.      Pulses: Normal pulses.      Heart sounds: Normal heart sounds.   Pulmonary:      Effort: Pulmonary effort is normal. No respiratory distress.      Breath sounds: Normal breath sounds.   Abdominal:      General: Abdomen is flat. Bowel sounds are normal.      Palpations: Abdomen is soft.   Musculoskeletal:         General: Normal range of motion.      Cervical back: Normal range of motion.      Right lower leg: No edema.      Left lower leg: No edema.   Skin:     General: Skin is warm and dry.      Capillary Refill: Capillary refill takes less than 2 seconds.   Neurological:      General: No focal deficit present.      Mental Status: She is alert. Mental status is at baseline.      Gait: Gait abnormal (use of RW currently).   Psychiatric:         Mood and Affect: Mood normal.         Behavior: Behavior normal.         Thought Content: Thought content normal.           Lab Results: I have reviewed the following results:CBC/BMP:   .     10/29/24  0437   WBC 7.00   HGB 12.5   HCT 38.4      SODIUM 139   K 3.9      CO2 31   BUN 13   CREATININE 0.63   GLUC 107   MG 2.1   PHOS 3.0      Imaging Results Review: No pertinent imaging studies reviewed.  Other Study Results Review: No additional pertinent studies reviewed.    Therapies:   Basic Mobility Inpatient Raw Score: 17  JH-HLM Goal: 5: Stand one or more mins  JH-HLM Achieved: 6: Walk 10 steps or more  PT:  consulted  OT: consulted    VTE Prophylaxis: VTE covered by:  apixaban, Oral, 5 mg at 10/28/24 4545    and Sequential compression device (Venodyne)     Code Status: Level 1 - Full Code      Family and Social Support:   Living Arrangements: Lives w/ Spouse/significant other  Support Systems: Self; Spouse/significant other; Daughter  Type of Current Residence: 2 story home  Discharge planning discussed with:: Patient and spouse at bedside.  [place] of Choice: Yes      Goals of Care: STR/return home       27-Dec-2017

## 2024-11-12 ENCOUNTER — APPOINTMENT (OUTPATIENT)
Dept: PULMONOLOGY | Facility: CLINIC | Age: 69
End: 2024-11-12
Payer: MEDICARE

## 2024-11-12 VITALS
DIASTOLIC BLOOD PRESSURE: 82 MMHG | HEART RATE: 83 BPM | BODY MASS INDEX: 43.54 KG/M2 | SYSTOLIC BLOOD PRESSURE: 124 MMHG | TEMPERATURE: 96.6 F | WEIGHT: 255 LBS | OXYGEN SATURATION: 99 % | HEIGHT: 64 IN

## 2024-11-12 DIAGNOSIS — R13.10 DYSPHAGIA, UNSPECIFIED: ICD-10-CM

## 2024-11-12 DIAGNOSIS — J45.41 MODERATE PERSISTENT ASTHMA WITH (ACUTE) EXACERBATION: ICD-10-CM

## 2024-11-12 PROCEDURE — 99215 OFFICE O/P EST HI 40 MIN: CPT

## 2024-11-12 RX ORDER — BUDESONIDE AND FORMOTEROL FUMARATE DIHYDRATE 80; 4.5 UG/1; UG/1
80-4.5 AEROSOL RESPIRATORY (INHALATION) TWICE DAILY
Qty: 1 | Refills: 6 | Status: ACTIVE | COMMUNITY
Start: 2024-11-12 | End: 1900-01-01

## 2025-02-10 ENCOUNTER — APPOINTMENT (OUTPATIENT)
Dept: PULMONOLOGY | Facility: CLINIC | Age: 70
End: 2025-02-10

## 2025-02-26 ENCOUNTER — APPOINTMENT (OUTPATIENT)
Facility: CLINIC | Age: 70
End: 2025-02-26

## 2025-05-20 NOTE — H&P PST ADULT - PSYCHIATRIC
Called and left VM for Patient to call office to schedule Medication Review    details… detailed exam